# Patient Record
Sex: FEMALE | Race: WHITE | NOT HISPANIC OR LATINO | Employment: UNEMPLOYED | URBAN - METROPOLITAN AREA
[De-identification: names, ages, dates, MRNs, and addresses within clinical notes are randomized per-mention and may not be internally consistent; named-entity substitution may affect disease eponyms.]

---

## 2017-02-28 ENCOUNTER — TELEPHONE (OUTPATIENT)
Dept: ORTHOPEDIC SURGERY | Facility: CLINIC | Age: 52
End: 2017-02-28

## 2017-02-28 ENCOUNTER — OFFICE VISIT (OUTPATIENT)
Dept: ORTHOPEDIC SURGERY | Facility: CLINIC | Age: 52
End: 2017-02-28

## 2017-02-28 DIAGNOSIS — G56.01 CARPAL TUNNEL SYNDROME OF RIGHT WRIST: ICD-10-CM

## 2017-02-28 DIAGNOSIS — R52 PAIN: Primary | ICD-10-CM

## 2017-02-28 DIAGNOSIS — M65.341 TRIGGER RING FINGER OF RIGHT HAND: ICD-10-CM

## 2017-02-28 PROCEDURE — 99213 OFFICE O/P EST LOW 20 MIN: CPT | Performed by: NURSE PRACTITIONER

## 2017-02-28 PROCEDURE — 73130 X-RAY EXAM OF HAND: CPT | Performed by: NURSE PRACTITIONER

## 2017-02-28 PROCEDURE — 20600 DRAIN/INJ JOINT/BURSA W/O US: CPT | Performed by: NURSE PRACTITIONER

## 2017-02-28 RX ORDER — LIDOCAINE HYDROCHLORIDE 10 MG/ML
1 INJECTION, SOLUTION INFILTRATION; PERINEURAL
Status: COMPLETED | OUTPATIENT
Start: 2017-02-28 | End: 2017-02-28

## 2017-02-28 RX ORDER — TRIAMCINOLONE ACETONIDE 40 MG/ML
40 INJECTION, SUSPENSION INTRA-ARTICULAR; INTRAMUSCULAR
Status: COMPLETED | OUTPATIENT
Start: 2017-02-28 | End: 2017-02-28

## 2017-02-28 RX ADMIN — TRIAMCINOLONE ACETONIDE 40 MG: 40 INJECTION, SUSPENSION INTRA-ARTICULAR; INTRAMUSCULAR at 09:08

## 2017-02-28 RX ADMIN — LIDOCAINE HYDROCHLORIDE 1 ML: 10 INJECTION, SOLUTION INFILTRATION; PERINEURAL at 09:08

## 2017-02-28 NOTE — TELEPHONE ENCOUNTER
Patient calling stating that her hand is killing her since the injection has worn off.  She has been advised to ice the site for 20 minutes on and 20 minutes off. Patient is currently on Xarelto.     Please advise.

## 2017-02-28 NOTE — PROGRESS NOTES
Subjective:     Patient ID: Jacranjith Campbell is a 52 y.o. female.    Chief Complaint: Right hand, fourth digit pain    History of Present Illness  Patient is 52 y.o female who presents with a reported one week history of pain present fourth digit, right hand palmar surface. Reports decreased range of motion fourth digit due to increased pain. She is right hand dominant and pain is interfering with her duties at work. Denies known injury, has been seen by PCP and instructed likely fracture at fourth digit. Denies prior x-ray, MRI and all other imaging. Reports injury to hand back in November 2016, believed she fractured at that time however has not had issues with hand/finger since that time. Denies all other concerns present at this time.      Social History     Occupational History   • Not on file.     Social History Main Topics   • Smoking status: Current Every Day Smoker     Types: Electronic Cigarette   • Smokeless tobacco: Never Used      Comment: e cigarettes   • Alcohol use No   • Drug use: No   • Sexual activity: Defer      Past Medical History   Diagnosis Date   • Anxiety    • Asthma    • COPD (chronic obstructive pulmonary disease)    • Depression    • DVT (deep venous thrombosis)    • Epilepsy    • Osteoarthritis    • Seasonal allergies    • Seizures      Past Surgical History   Procedure Laterality Date   • Hysterectomy     • Bladder surgery     • Carpal tunnel release     • Trigger finger release     • Pr ercp dx collection specimen brushing/washing N/A 10/26/2016     Procedure: ENDOSCOPIC RETROGRADE CHOLANGIOPANCREATOGRAPHY dilation common bile duct;  Surgeon: Steven Flynn MD;  Location: [facility];  Service: Gastroenterology   • Ercp         Family History   Problem Relation Age of Onset   • Cancer Mother          Review of Systems   Constitutional: Negative for chills, diaphoresis, fever and unexpected weight change.   HENT: Negative for hearing loss, nosebleeds, sore throat and  tinnitus.    Eyes: Negative for pain and visual disturbance.   Respiratory: Negative for cough, shortness of breath and wheezing.    Cardiovascular: Negative for chest pain and palpitations.   Gastrointestinal: Negative for abdominal pain, diarrhea, nausea and vomiting.   Endocrine: Negative for cold intolerance, heat intolerance and polydipsia.   Genitourinary: Negative for difficulty urinating, dysuria and hematuria.   Musculoskeletal: Negative for arthralgias, joint swelling and myalgias.   Skin: Negative for rash and wound.   Allergic/Immunologic: Negative for environmental allergies.   Neurological: Negative for dizziness, syncope and numbness.   Hematological: Does not bruise/bleed easily.   Psychiatric/Behavioral: Negative for dysphoric mood and sleep disturbance. The patient is not nervous/anxious.    All other systems reviewed and are negative.          Objective:  Physical Exam    General: No acute distress.  Eyes: conjunctiva clear; pupils equally round and reactive  ENT: external ears and nose atraumatic; oropharynx clear  CV: no peripheral edema  Resp: normal respiratory effort  Skin: no rashes or wounds; normal turgor  Psych: mood and affect appropriate; recent and remote memory intact    There were no vitals filed for this visit.  There were no vitals filed for this visit.  There is no height or weight on file to calculate BMI.     Right Hand Exam     Tenderness   The patient is experiencing tenderness in the chaves area.    Range of Motion     Hand   MP Ring: abnormal   PIP Ring: normal   DIP Ring: normal     Muscle Strength   Wrist Extension: 5/5   Wrist Flexion: 5/5   : 4/5     Tests   Phalen’s Sign: positive  Tinel’s Sign (Medial Nerve): positive  Finkelstein: negative    Other   Erythema: absent  Scars: absent  Sensation: normal  Pulse: present    Comments:  Mild edema fourth digit, palmar surface           Imaging:  Right Hand X-Ray  Indication: Pain  AP, Lateral, and Oblique  views    Findings:  No fracture  No bony lesion  Normal soft tissues  No prior studies were available for comparison.    Assessment:       1. Pain    2. Trigger ring finger of right hand          Plan:  1. Discussed plan of care with patient. Wishes to proceed with corticosteroid injection right hand.  2. Provided with wrist immobilizer right wrist. Discussed to be worn until pain has subsided. Will follow-up if symptoms not improving. Patient verbalized understanding of all information and agrees with plan of care. Denies all other concerns present at this time.       Small Joint Arthrocentesis  Procedure Details  Location: ring finger -   Needle size: 22 G  Medications administered: 1 mL lidocaine 1 %; 40 mg triamcinolone acetonide 40 MG/ML

## 2017-04-10 ENCOUNTER — OFFICE (OUTPATIENT)
Dept: URBAN - METROPOLITAN AREA CLINIC 71 | Facility: CLINIC | Age: 52
End: 2017-04-10
Payer: COMMERCIAL

## 2017-04-10 VITALS
HEART RATE: 72 BPM | WEIGHT: 136 LBS | SYSTOLIC BLOOD PRESSURE: 110 MMHG | DIASTOLIC BLOOD PRESSURE: 80 MMHG | HEIGHT: 59 IN

## 2017-04-10 DIAGNOSIS — K21.0 GASTRO-ESOPHAGEAL REFLUX DISEASE WITH ESOPHAGITIS: ICD-10-CM

## 2017-04-10 DIAGNOSIS — K30 FUNCTIONAL DYSPEPSIA: ICD-10-CM

## 2017-04-10 PROCEDURE — 99212 OFFICE O/P EST SF 10 MIN: CPT

## 2017-04-10 RX ORDER — SODIUM PHOSPHATE, MONOBASIC, MONOHYDRATE, SODIUM PHOSPHATE, DIBASIC ANHYDROUS 1.102; .398 G/1; G/1
TABLET ORAL
Qty: 28 | Refills: 0 | Status: ACTIVE
Start: 2017-04-10

## 2017-05-11 ENCOUNTER — ANESTHESIA EVENT (OUTPATIENT)
Dept: PERIOP | Facility: HOSPITAL | Age: 52
End: 2017-05-11

## 2017-05-12 ENCOUNTER — PREP FOR SURGERY (OUTPATIENT)
Dept: GASTROENTEROLOGY | Facility: HOSPITAL | Age: 52
End: 2017-05-12

## 2017-05-12 ENCOUNTER — ON CAMPUS - OUTPATIENT (OUTPATIENT)
Dept: URBAN - METROPOLITAN AREA HOSPITAL 28 | Facility: HOSPITAL | Age: 52
End: 2017-05-12
Payer: COMMERCIAL

## 2017-05-12 ENCOUNTER — HOSPITAL ENCOUNTER (OUTPATIENT)
Facility: HOSPITAL | Age: 52
Setting detail: HOSPITAL OUTPATIENT SURGERY
Discharge: HOME | End: 2017-05-12
Attending: INTERNAL MEDICINE | Admitting: INTERNAL MEDICINE

## 2017-05-12 ENCOUNTER — ANESTHESIA (OUTPATIENT)
Dept: PERIOP | Facility: HOSPITAL | Age: 52
End: 2017-05-12

## 2017-05-12 VITALS
TEMPERATURE: 98 F | HEART RATE: 76 BPM | HEIGHT: 59 IN | SYSTOLIC BLOOD PRESSURE: 122 MMHG | OXYGEN SATURATION: 98 % | BODY MASS INDEX: 27.44 KG/M2 | DIASTOLIC BLOOD PRESSURE: 85 MMHG | RESPIRATION RATE: 15 BRPM | WEIGHT: 136.13 LBS

## 2017-05-12 DIAGNOSIS — K62.1 RECTAL POLYP: ICD-10-CM

## 2017-05-12 DIAGNOSIS — K21.0 GASTRO-ESOPHAGEAL REFLUX DISEASE WITH ESOPHAGITIS: ICD-10-CM

## 2017-05-12 DIAGNOSIS — Z12.11 SCREENING FOR COLON CANCER: ICD-10-CM

## 2017-05-12 DIAGNOSIS — R10.13 DYSPEPSIA: ICD-10-CM

## 2017-05-12 DIAGNOSIS — D12.2 BENIGN NEOPLASM OF ASCENDING COLON: ICD-10-CM

## 2017-05-12 DIAGNOSIS — R10.13 EPIGASTRIC PAIN: ICD-10-CM

## 2017-05-12 DIAGNOSIS — D12.3 BENIGN NEOPLASM OF TRANSVERSE COLON: ICD-10-CM

## 2017-05-12 DIAGNOSIS — K30 FUNCTIONAL DYSPEPSIA: ICD-10-CM

## 2017-05-12 DIAGNOSIS — K29.70 GASTRITIS, UNSPECIFIED, WITHOUT BLEEDING: ICD-10-CM

## 2017-05-12 DIAGNOSIS — Z12.11 ENCOUNTER FOR SCREENING FOR MALIGNANT NEOPLASM OF COLON: ICD-10-CM

## 2017-05-12 DIAGNOSIS — D12.5 BENIGN NEOPLASM OF SIGMOID COLON: ICD-10-CM

## 2017-05-12 PROCEDURE — 45380 COLONOSCOPY AND BIOPSY: CPT

## 2017-05-12 PROCEDURE — 25010000002 FENTANYL CITRATE (PF) 100 MCG/2ML SOLUTION: Performed by: NURSE ANESTHETIST, CERTIFIED REGISTERED

## 2017-05-12 PROCEDURE — 25010000002 PROPOFOL 10 MG/ML EMULSION: Performed by: NURSE ANESTHETIST, CERTIFIED REGISTERED

## 2017-05-12 PROCEDURE — 43239 EGD BIOPSY SINGLE/MULTIPLE: CPT

## 2017-05-12 RX ORDER — LIDOCAINE HYDROCHLORIDE 20 MG/ML
INJECTION, SOLUTION INFILTRATION; PERINEURAL AS NEEDED
Status: DISCONTINUED | OUTPATIENT
Start: 2017-05-12 | End: 2017-05-12 | Stop reason: SURG

## 2017-05-12 RX ORDER — PROPOFOL 10 MG/ML
VIAL (ML) INTRAVENOUS CONTINUOUS PRN
Status: DISCONTINUED | OUTPATIENT
Start: 2017-05-12 | End: 2017-05-12 | Stop reason: SURG

## 2017-05-12 RX ORDER — SODIUM CHLORIDE 0.9 % (FLUSH) 0.9 %
1-10 SYRINGE (ML) INJECTION AS NEEDED
Status: CANCELLED | OUTPATIENT
Start: 2017-05-12

## 2017-05-12 RX ORDER — PROPOFOL 10 MG/ML
VIAL (ML) INTRAVENOUS AS NEEDED
Status: DISCONTINUED | OUTPATIENT
Start: 2017-05-12 | End: 2017-05-12 | Stop reason: SURG

## 2017-05-12 RX ORDER — FENTANYL CITRATE 50 UG/ML
INJECTION, SOLUTION INTRAMUSCULAR; INTRAVENOUS AS NEEDED
Status: DISCONTINUED | OUTPATIENT
Start: 2017-05-12 | End: 2017-05-12 | Stop reason: SURG

## 2017-05-12 RX ORDER — SODIUM CHLORIDE 0.9 % (FLUSH) 0.9 %
1-10 SYRINGE (ML) INJECTION AS NEEDED
Status: DISCONTINUED | OUTPATIENT
Start: 2017-05-12 | End: 2017-05-12 | Stop reason: HOSPADM

## 2017-05-12 RX ORDER — LIDOCAINE HYDROCHLORIDE 10 MG/ML
0.5 INJECTION, SOLUTION EPIDURAL; INFILTRATION; INTRACAUDAL; PERINEURAL ONCE AS NEEDED
Status: COMPLETED | OUTPATIENT
Start: 2017-05-12 | End: 2017-05-12

## 2017-05-12 RX ORDER — SODIUM CHLORIDE, SODIUM LACTATE, POTASSIUM CHLORIDE, CALCIUM CHLORIDE 600; 310; 30; 20 MG/100ML; MG/100ML; MG/100ML; MG/100ML
9 INJECTION, SOLUTION INTRAVENOUS CONTINUOUS PRN
Status: DISCONTINUED | OUTPATIENT
Start: 2017-05-12 | End: 2017-05-12 | Stop reason: HOSPADM

## 2017-05-12 RX ORDER — MAGNESIUM HYDROXIDE 1200 MG/15ML
LIQUID ORAL AS NEEDED
Status: DISCONTINUED | OUTPATIENT
Start: 2017-05-12 | End: 2017-05-12 | Stop reason: HOSPADM

## 2017-05-12 RX ADMIN — FENTANYL CITRATE 25 MCG: 50 INJECTION, SOLUTION INTRAMUSCULAR; INTRAVENOUS at 10:34

## 2017-05-12 RX ADMIN — SODIUM CHLORIDE, POTASSIUM CHLORIDE, SODIUM LACTATE AND CALCIUM CHLORIDE: 600; 310; 30; 20 INJECTION, SOLUTION INTRAVENOUS at 09:49

## 2017-05-12 RX ADMIN — SODIUM CHLORIDE, POTASSIUM CHLORIDE, SODIUM LACTATE AND CALCIUM CHLORIDE 9 ML/HR: 600; 310; 30; 20 INJECTION, SOLUTION INTRAVENOUS at 10:17

## 2017-05-12 RX ADMIN — PROPOFOL 50 MG: 10 INJECTION, EMULSION INTRAVENOUS at 10:33

## 2017-05-12 RX ADMIN — PROPOFOL 50 MG: 10 INJECTION, EMULSION INTRAVENOUS at 10:31

## 2017-05-12 RX ADMIN — PROPOFOL 75 MCG/KG/MIN: 10 INJECTION, EMULSION INTRAVENOUS at 10:28

## 2017-05-12 RX ADMIN — PROPOFOL 50 MG: 10 INJECTION, EMULSION INTRAVENOUS at 10:52

## 2017-05-12 RX ADMIN — LIDOCAINE HYDROCHLORIDE 0.5 ML: 10 INJECTION, SOLUTION EPIDURAL; INFILTRATION; INTRACAUDAL; PERINEURAL at 10:17

## 2017-05-12 RX ADMIN — LIDOCAINE HYDROCHLORIDE 100 MG: 20 INJECTION, SOLUTION INFILTRATION; PERINEURAL at 10:28

## 2017-05-12 RX ADMIN — PROPOFOL 50 MG: 10 INJECTION, EMULSION INTRAVENOUS at 10:36

## 2017-05-12 RX ADMIN — PROPOFOL 50 MG: 10 INJECTION, EMULSION INTRAVENOUS at 10:29

## 2017-05-12 RX ADMIN — PROPOFOL 50 MG: 10 INJECTION, EMULSION INTRAVENOUS at 10:44

## 2017-05-12 RX ADMIN — FENTANYL CITRATE 25 MCG: 50 INJECTION, SOLUTION INTRAMUSCULAR; INTRAVENOUS at 10:32

## 2017-05-12 RX ADMIN — TOPICAL ANESTHETIC 1 SPRAY: 200 SPRAY DENTAL; PERIODONTAL at 10:27

## 2017-05-12 RX ADMIN — PROPOFOL 50 MG: 10 INJECTION, EMULSION INTRAVENOUS at 10:40

## 2017-05-17 LAB
LAB AP CASE REPORT: NORMAL
Lab: NORMAL
PATH REPORT.FINAL DX SPEC: NORMAL

## 2017-08-28 ENCOUNTER — OFFICE (OUTPATIENT)
Dept: URBAN - METROPOLITAN AREA CLINIC 71 | Facility: CLINIC | Age: 52
End: 2017-08-28
Payer: COMMERCIAL

## 2017-08-28 VITALS
HEIGHT: 59 IN | DIASTOLIC BLOOD PRESSURE: 68 MMHG | SYSTOLIC BLOOD PRESSURE: 102 MMHG | HEART RATE: 72 BPM | WEIGHT: 142 LBS

## 2017-08-28 DIAGNOSIS — Z86.010 PERSONAL HISTORY OF COLONIC POLYPS: ICD-10-CM

## 2017-08-28 DIAGNOSIS — K29.70 GASTRITIS, UNSPECIFIED, WITHOUT BLEEDING: ICD-10-CM

## 2017-08-28 DIAGNOSIS — K22.70 BARRETT'S ESOPHAGUS WITHOUT DYSPLASIA: ICD-10-CM

## 2017-08-28 PROCEDURE — 99213 OFFICE O/P EST LOW 20 MIN: CPT

## 2017-08-28 RX ORDER — OMEPRAZOLE 40 MG/1
80 CAPSULE, DELAYED RELEASE ORAL
Qty: 180 | Refills: 4 | Status: ACTIVE
Start: 2017-08-28

## 2023-12-20 PROBLEM — K21.00 GASTROESOPHAGEAL REFLUX DISEASE WITH ESOPHAGITIS WITHOUT HEMORRHAGE: Status: ACTIVE | Noted: 2023-12-20

## 2023-12-20 PROBLEM — K58.1 IRRITABLE BOWEL SYNDROME WITH CONSTIPATION: Status: ACTIVE | Noted: 2023-12-20

## 2023-12-20 PROBLEM — Z86.010 PERSONAL HISTORY OF COLONIC POLYPS: Status: ACTIVE | Noted: 2023-12-20

## 2023-12-20 PROBLEM — R10.13 DYSPEPSIA: Status: ACTIVE | Noted: 2023-12-20

## 2023-12-20 PROBLEM — Z86.0100 PERSONAL HISTORY OF COLONIC POLYPS: Status: ACTIVE | Noted: 2023-12-20

## 2023-12-28 ENCOUNTER — OFFICE VISIT (OUTPATIENT)
Age: 58
End: 2023-12-28
Payer: COMMERCIAL

## 2023-12-28 VITALS — DIASTOLIC BLOOD PRESSURE: 70 MMHG | BODY MASS INDEX: 26.05 KG/M2 | SYSTOLIC BLOOD PRESSURE: 118 MMHG | WEIGHT: 129 LBS

## 2023-12-28 DIAGNOSIS — K21.00 GASTROESOPHAGEAL REFLUX DISEASE WITH ESOPHAGITIS WITHOUT HEMORRHAGE: ICD-10-CM

## 2023-12-28 DIAGNOSIS — R10.13 DYSPEPSIA: ICD-10-CM

## 2023-12-28 DIAGNOSIS — K58.1 IRRITABLE BOWEL SYNDROME WITH CONSTIPATION: Primary | ICD-10-CM

## 2023-12-28 DIAGNOSIS — Z86.010 PERSONAL HISTORY OF COLONIC POLYPS: ICD-10-CM

## 2023-12-28 RX ORDER — OMEPRAZOLE 40 MG/1
40 CAPSULE, DELAYED RELEASE ORAL
Qty: 180 CAPSULE | Refills: 3 | Status: SHIPPED | OUTPATIENT
Start: 2023-12-28

## 2023-12-28 RX ORDER — DIPHENHYDRAMINE HCL 25 MG/1
25 TABLET ORAL NIGHTLY PRN
COMMUNITY

## 2023-12-28 RX ORDER — CITALOPRAM 20 MG/1
20 TABLET ORAL DAILY
COMMUNITY

## 2023-12-28 RX ORDER — LISINOPRIL 2.5 MG/1
2.5 TABLET ORAL DAILY
COMMUNITY
Start: 2023-05-03 | End: 2024-05-02

## 2023-12-28 NOTE — PROGRESS NOTES
Sarkis Campbell is a 58 y.o. female with a past medical history of IBS-C, COPD, Asthma, DVT on Xarelto + noted below who presents for evaluation of   Chief Complaint   Patient presents with    Abdominal Pain    Constipation       Subjective     # IBS-C   # Personal history of colon polyps  - Reports having a BM every 2 weeks that's been ongoing for at least 2 years.  - Seen by her PCP on 11/28 and started on Doc-senna 1 tab daily but has   - Last colonoscopy in 5/2017 had 4 sub-cm polyps removed (3 were tubular adenomas and one hyperplastic polyp). Surveillance c/s was due in 5/2022.      # Dyspepsia   - Adherent to Omeprazole 40 mg QD.   - Endorses post-prandial epigastric pain occurring once per week despite adherence to Omeprazole 40 mg QD.      # GERD   - Adherent to Omeprazole 40 mg QD. Denies heartburn.   - Last EGD in 5/2017 had reflux esophagitis (biopsied -- BE without dysplasia), gastritis (biopsied -- negative for H Pylori).                    Past Medical History:   Diagnosis Date    Anxiety     Asthma     Back pain     COPD (chronic obstructive pulmonary disease)     Depression     DVT (deep venous thrombosis)     Epilepsy     Osteoarthritis     Seasonal allergies     Seizures          Current Outpatient Medications:     Aclidinium Bromide (TUDORZA PRESSAIR IN), Inhale., Disp: , Rfl:     albuterol (PROVENTIL HFA;VENTOLIN HFA) 108 (90 BASE) MCG/ACT inhaler, Inhale 2 puffs Every 4 (Four) Hours As Needed for Wheezing., Disp: , Rfl:     Banophen 25 MG tablet, Take 1 tablet by mouth At Night As Needed., Disp: , Rfl:     CarBAMazepine ER, Antipsych, 300 MG capsule sustained-release 12 hr, Take  by mouth 2 (two) times a day., Disp: , Rfl:     citalopram (CeleXA) 20 MG tablet, Take 1 tablet by mouth Daily., Disp: , Rfl:     fluticasone-salmeterol (ADVAIR) 250-50 MCG/DOSE DISKUS, Inhale 2 (two) times a day., Disp: , Rfl:     lisinopril (PRINIVIL,ZESTRIL) 2.5 MG tablet, Take 1 tablet by mouth Daily., Disp: ,  Rfl:     loratadine (CLARITIN) 10 MG tablet, Take 1 tablet by mouth Daily., Disp: , Rfl:     omeprazole (PrilOSEC) 40 MG capsule, Take 1 capsule by mouth 2 (Two) Times a Day Before Meals., Disp: 180 capsule, Rfl: 3    pramipexole (MIRAPEX) 0.25 MG tablet, Take 1 tablet by mouth., Disp: , Rfl:     ARIPiprazole (ABILIFY) 5 MG tablet, Take  by mouth daily. (Patient not taking: Reported on 12/28/2023), Disp: , Rfl:     DULoxetine (CYMBALTA) 30 MG capsule, Take 30 mg by mouth daily. (Patient not taking: Reported on 12/28/2023), Disp: , Rfl:     levETIRAcetam (KEPPRA) 500 MG tablet, Take 500 mg by mouth 2 (Two) Times a Day. (Patient not taking: Reported on 12/28/2023), Disp: , Rfl:     linaclotide (Linzess) 145 MCG capsule capsule, Take 1 capsule by mouth Every Morning Before Breakfast., Disp: 30 capsule, Rfl: 11    moxifloxacin (AVELOX) 400 MG tablet, Take 1 tablet by mouth Daily. (Patient not taking: Reported on 12/28/2023), Disp: 4 tablet, Rfl: 0    rivaroxaban (XARELTO) 20 MG tablet, Take 20 mg by mouth Daily. Stopped medication for ERCP on 10/22/16.  Has been out of medication and waiting for her doctor to refill med. (Patient not taking: Reported on 12/28/2023), Disp: , Rfl:     rivaroxaban (XARELTO) 20 MG tablet, Take 1 tablet by mouth Daily. (Patient not taking: Reported on 12/28/2023), Disp: 30 tablet, Rfl: 0    venlafaxine (EFFEXOR) 75 MG tablet, Take 75 mg by mouth 2 (two) times a day. (Patient not taking: Reported on 12/28/2023), Disp: , Rfl:     Allergies   Allergen Reactions    Codeine     Darvon [Propoxyphene]      DARVOCET    Sulfamethoxazole-Trimethoprim     Tramadol     Tylenol [Acetaminophen]     Zithromax [Azithromycin]        Social History     Socioeconomic History    Marital status:    Tobacco Use    Smoking status: Every Day     Types: Cigarettes     Passive exposure: Current    Smokeless tobacco: Never    Tobacco comments:     e cigarettes   Vaping Use    Vaping Use: Former   Substance and  Sexual Activity    Alcohol use: No    Drug use: No    Sexual activity: Defer     Birth control/protection: Abstinence       Family History   Problem Relation Age of Onset    Cancer Mother        Objective     Vitals:    12/28/23 1543   BP: 118/70         12/28/23  1543   Weight: 58.5 kg (129 lb) (Patient reported)     Body mass index is 26.05 kg/m².    Physical Exam  Constitutional:       Appearance: Normal appearance.   HENT:      Head: Normocephalic and atraumatic.   Eyes:      Extraocular Movements: Extraocular movements intact.      Conjunctiva/sclera: Conjunctivae normal.   Cardiovascular:      Rate and Rhythm: Normal rate and regular rhythm.      Heart sounds: Normal heart sounds.   Pulmonary:      Effort: Pulmonary effort is normal.      Breath sounds: Normal breath sounds.   Abdominal:      General: Abdomen is flat. Bowel sounds are normal. There is no distension.      Palpations: Abdomen is soft.      Tenderness: There is abdominal tenderness (mild epigastric).   Neurological:      Mental Status: She is alert.   Psychiatric:         Mood and Affect: Mood normal.         Behavior: Behavior normal.         WBC   Date Value Ref Range Status   10/26/2016 5.56 4.80 - 10.80 10*3/mm3 Final     RBC   Date Value Ref Range Status   10/26/2016 3.95 (L) 4.20 - 5.40 10*6/mm3 Final     Hemoglobin   Date Value Ref Range Status   10/26/2016 12.8 12.0 - 16.0 g/dL Final     Hematocrit   Date Value Ref Range Status   10/26/2016 38.0 37.0 - 47.0 % Final     MCV   Date Value Ref Range Status   10/26/2016 96.2 81.0 - 99.0 fL Final     MCH   Date Value Ref Range Status   10/26/2016 32.4 (H) 27.0 - 31.0 pg Final     MCHC   Date Value Ref Range Status   10/26/2016 33.7 31.0 - 37.0 g/dL Final     RDW   Date Value Ref Range Status   10/26/2016 17.9 (H) 11.5 - 14.5 % Final     RDW-SD   Date Value Ref Range Status   10/26/2016 63.6 (H) 37.0 - 54.0 fl Final     MPV   Date Value Ref Range Status   10/26/2016 10.6 (H) 7.4 - 10.4 fL Final      Platelets   Date Value Ref Range Status   10/26/2016 213 140 - 500 10*3/mm3 Final     Neutrophil %   Date Value Ref Range Status   10/26/2016 52.5 45.0 - 70.0 % Final     Lymphocyte %   Date Value Ref Range Status   10/26/2016 38.3 20.0 - 45.0 % Final     Monocyte %   Date Value Ref Range Status   10/26/2016 7.6 3.0 - 8.0 % Final     Eosinophil %   Date Value Ref Range Status   10/26/2016 0.0 0.0 - 4.0 % Final     Basophil %   Date Value Ref Range Status   10/26/2016 1.6 0.0 - 2.0 % Final     Immature Grans %   Date Value Ref Range Status   10/26/2016 0.0 0.0 - 0.5 % Final     Neutrophils, Absolute   Date Value Ref Range Status   10/26/2016 2.92 1.50 - 8.30 10*3/mm3 Final     Lymphocytes, Absolute   Date Value Ref Range Status   10/26/2016 2.13 0.60 - 4.80 10*3/mm3 Final     Monocytes, Absolute   Date Value Ref Range Status   10/26/2016 0.42 0.00 - 1.00 10*3/mm3 Final     Eosinophils, Absolute   Date Value Ref Range Status   10/26/2016 0.00 (L) 0.10 - 0.30 10*3/mm3 Final     Basophils, Absolute   Date Value Ref Range Status   10/26/2016 0.09 0.00 - 0.20 10*3/mm3 Final     Immature Grans, Absolute   Date Value Ref Range Status   10/26/2016 0.00 0.00 - 0.03 10*3/mm3 Final     nRBC   Date Value Ref Range Status   10/26/2016 0.0 0.0 - 0.0 /100 WBC Final       Glucose   Date Value Ref Range Status   10/26/2016 80 65 - 99 mg/dL Final     Sodium   Date Value Ref Range Status   11/19/2021 139 137 - 145 mmol/L Final     Potassium   Date Value Ref Range Status   11/19/2021 3.9 3.5 - 5.1 mmol/L Final     Total CO2   Date Value Ref Range Status   11/19/2021 24 22 - 30 mmol/L Final     Chloride   Date Value Ref Range Status   11/19/2021 100 98 - 107 mmol/L Final     Anion Gap   Date Value Ref Range Status   10/26/2016 11.2 mmol/L Final     Creatinine   Date Value Ref Range Status   11/19/2021 0.7 0.7 - 1.5 mg/dL Final     BUN   Date Value Ref Range Status   11/19/2021 9 7 - 20 mg/dL Final     BUN/Creatinine Ratio   Date  Value Ref Range Status   11/19/2021 13.0 10.0 - 20.0 RATIO Final     Calcium   Date Value Ref Range Status   11/19/2021 9.3 8.4 - 10.2 mg/dL Final     eGFR Non  Amer   Date Value Ref Range Status   10/26/2016 102 >60 mL/min/1.73 Final     Alkaline Phosphatase   Date Value Ref Range Status   11/19/2021 90 38 - 126 U/L Final     Total Protein   Date Value Ref Range Status   11/19/2021 6.7 6.3 - 8.2 g/dL Final     ALT (SGPT)   Date Value Ref Range Status   11/19/2021 11 (L) 13 - 69 U/L Final     AST (SGOT)   Date Value Ref Range Status   11/19/2021 20 15 - 46 U/L Final     Total Bilirubin   Date Value Ref Range Status   11/19/2021 0.3 0.2 - 1.3 mg/dL Final     Albumin   Date Value Ref Range Status   11/19/2021 4.3 3.5 - 5.0 g/dL Final     Globulin   Date Value Ref Range Status   11/19/2021 2.4 1.5 - 4.5 g/dL Final     A/G Ratio   Date Value Ref Range Status   11/19/2021 1.8 1.1 - 2.5 RATIO Final         Imaging Results (Last 7 Days)       ** No results found for the last 168 hours. **                   Assessment & Plan     Diagnoses and all orders for this visit:    1. Irritable bowel syndrome with constipation (Primary)  Assessment & Plan:  - Start Linzess 145 mcg QD      Orders:  -     linaclotide (Linzess) 145 MCG capsule capsule; Take 1 capsule by mouth Every Morning Before Breakfast.  Dispense: 30 capsule; Refill: 11    2. Dyspepsia  Assessment & Plan:  - Suspect the etiology is due to IBS-C   - Increase to Omeprazole 40 mg BID   - EGD to further evaluate     Orders:  -     omeprazole (PrilOSEC) 40 MG capsule; Take 1 capsule by mouth 2 (Two) Times a Day Before Meals.  Dispense: 180 capsule; Refill: 3  -     Case Request; Standing  -     Case Request    3. Gastroesophageal reflux disease with esophagitis without hemorrhage  Assessment & Plan:  - Continue Omeprazole 40 mg QD   - EGD to evaluate for possible BE given last EGD in 2017       4. Personal history of colonic polyps  Assessment & Plan:  - Overdue  for surveillance c/s, will schedule      Orders:  -     Case Request; Standing  -     Case Request    Other orders  -     Obtain Informed Consent; Standing  -     Follow Anesthesia Guidelines / Protocol; Future  -     Verify Bowel Prep Was Successful; Standing  -     Give Tap Water Enema If Bowel Prep Insufficient; Standing    RTC in 3 months    I have discussed the above plan with the patient.  They verbalize understanding and are in agreement with the plan.  They have been advised to contact the office for any questions, concerns, or changes related to their health.

## 2023-12-28 NOTE — ASSESSMENT & PLAN NOTE
- Suspect the etiology is due to IBS-C   - Increase to Omeprazole 40 mg BID   - EGD to further evaluate

## 2024-01-05 ENCOUNTER — ANESTHESIA EVENT (OUTPATIENT)
Dept: PERIOP | Facility: HOSPITAL | Age: 59
End: 2024-01-05
Payer: COMMERCIAL

## 2024-01-08 ENCOUNTER — ANESTHESIA (OUTPATIENT)
Dept: PERIOP | Facility: HOSPITAL | Age: 59
End: 2024-01-08
Payer: COMMERCIAL

## 2024-01-08 ENCOUNTER — HOSPITAL ENCOUNTER (OUTPATIENT)
Facility: HOSPITAL | Age: 59
Setting detail: HOSPITAL OUTPATIENT SURGERY
Discharge: HOME | End: 2024-01-08
Attending: STUDENT IN AN ORGANIZED HEALTH CARE EDUCATION/TRAINING PROGRAM | Admitting: STUDENT IN AN ORGANIZED HEALTH CARE EDUCATION/TRAINING PROGRAM
Payer: COMMERCIAL

## 2024-01-08 VITALS
WEIGHT: 126.4 LBS | BODY MASS INDEX: 25.48 KG/M2 | RESPIRATION RATE: 16 BRPM | OXYGEN SATURATION: 98 % | HEART RATE: 78 BPM | SYSTOLIC BLOOD PRESSURE: 119 MMHG | DIASTOLIC BLOOD PRESSURE: 67 MMHG | TEMPERATURE: 97.6 F | HEIGHT: 59 IN

## 2024-01-08 DIAGNOSIS — Z86.010 PERSONAL HISTORY OF COLONIC POLYPS: Primary | ICD-10-CM

## 2024-01-08 DIAGNOSIS — R10.13 DYSPEPSIA: ICD-10-CM

## 2024-01-08 DIAGNOSIS — Z86.010 PERSONAL HISTORY OF COLONIC POLYPS: ICD-10-CM

## 2024-01-08 PROCEDURE — 88342 IMHCHEM/IMCYTCHM 1ST ANTB: CPT | Performed by: STUDENT IN AN ORGANIZED HEALTH CARE EDUCATION/TRAINING PROGRAM

## 2024-01-08 PROCEDURE — 25010000002 PROPOFOL 200 MG/20ML EMULSION: Performed by: NURSE ANESTHETIST, CERTIFIED REGISTERED

## 2024-01-08 PROCEDURE — 88305 TISSUE EXAM BY PATHOLOGIST: CPT | Performed by: STUDENT IN AN ORGANIZED HEALTH CARE EDUCATION/TRAINING PROGRAM

## 2024-01-08 PROCEDURE — 45378 DIAGNOSTIC COLONOSCOPY: CPT | Performed by: STUDENT IN AN ORGANIZED HEALTH CARE EDUCATION/TRAINING PROGRAM

## 2024-01-08 PROCEDURE — 43239 EGD BIOPSY SINGLE/MULTIPLE: CPT | Performed by: STUDENT IN AN ORGANIZED HEALTH CARE EDUCATION/TRAINING PROGRAM

## 2024-01-08 PROCEDURE — 25810000003 LACTATED RINGERS PER 1000 ML: Performed by: NURSE ANESTHETIST, CERTIFIED REGISTERED

## 2024-01-08 RX ORDER — SODIUM CHLORIDE 9 MG/ML
40 INJECTION, SOLUTION INTRAVENOUS AS NEEDED
Status: DISCONTINUED | OUTPATIENT
Start: 2024-01-08 | End: 2024-01-08 | Stop reason: HOSPADM

## 2024-01-08 RX ORDER — SODIUM CHLORIDE 0.9 % (FLUSH) 0.9 %
10 SYRINGE (ML) INJECTION EVERY 12 HOURS SCHEDULED
Status: DISCONTINUED | OUTPATIENT
Start: 2024-01-08 | End: 2024-01-08 | Stop reason: HOSPADM

## 2024-01-08 RX ORDER — ONDANSETRON 2 MG/ML
4 INJECTION INTRAMUSCULAR; INTRAVENOUS ONCE AS NEEDED
Status: DISCONTINUED | OUTPATIENT
Start: 2024-01-08 | End: 2024-01-08 | Stop reason: HOSPADM

## 2024-01-08 RX ORDER — SODIUM CHLORIDE, SODIUM LACTATE, POTASSIUM CHLORIDE, CALCIUM CHLORIDE 600; 310; 30; 20 MG/100ML; MG/100ML; MG/100ML; MG/100ML
100 INJECTION, SOLUTION INTRAVENOUS CONTINUOUS
Status: DISCONTINUED | OUTPATIENT
Start: 2024-01-08 | End: 2024-01-08 | Stop reason: HOSPADM

## 2024-01-08 RX ORDER — SODIUM CHLORIDE, SODIUM LACTATE, POTASSIUM CHLORIDE, CALCIUM CHLORIDE 600; 310; 30; 20 MG/100ML; MG/100ML; MG/100ML; MG/100ML
9 INJECTION, SOLUTION INTRAVENOUS CONTINUOUS PRN
Status: DISCONTINUED | OUTPATIENT
Start: 2024-01-08 | End: 2024-01-08 | Stop reason: HOSPADM

## 2024-01-08 RX ORDER — LIDOCAINE HYDROCHLORIDE 20 MG/ML
INJECTION, SOLUTION INFILTRATION; PERINEURAL AS NEEDED
Status: DISCONTINUED | OUTPATIENT
Start: 2024-01-08 | End: 2024-01-08 | Stop reason: SURG

## 2024-01-08 RX ORDER — MAGNESIUM HYDROXIDE 1200 MG/15ML
LIQUID ORAL AS NEEDED
Status: DISCONTINUED | OUTPATIENT
Start: 2024-01-08 | End: 2024-01-08 | Stop reason: HOSPADM

## 2024-01-08 RX ORDER — SODIUM CHLORIDE 0.9 % (FLUSH) 0.9 %
10 SYRINGE (ML) INJECTION AS NEEDED
Status: DISCONTINUED | OUTPATIENT
Start: 2024-01-08 | End: 2024-01-08 | Stop reason: HOSPADM

## 2024-01-08 RX ORDER — PROPOFOL 10 MG/ML
INJECTION, EMULSION INTRAVENOUS AS NEEDED
Status: DISCONTINUED | OUTPATIENT
Start: 2024-01-08 | End: 2024-01-08 | Stop reason: SURG

## 2024-01-08 RX ADMIN — PROPOFOL INJECTABLE EMULSION 50 MG: 10 INJECTION, EMULSION INTRAVENOUS at 12:21

## 2024-01-08 RX ADMIN — PROPOFOL INJECTABLE EMULSION 100 MG: 10 INJECTION, EMULSION INTRAVENOUS at 11:59

## 2024-01-08 RX ADMIN — PROPOFOL INJECTABLE EMULSION 50 MG: 10 INJECTION, EMULSION INTRAVENOUS at 12:13

## 2024-01-08 RX ADMIN — PROPOFOL INJECTABLE EMULSION 50 MG: 10 INJECTION, EMULSION INTRAVENOUS at 12:17

## 2024-01-08 RX ADMIN — LIDOCAINE HYDROCHLORIDE 80 MG: 20 INJECTION, SOLUTION INFILTRATION; PERINEURAL at 11:59

## 2024-01-08 RX ADMIN — PROPOFOL INJECTABLE EMULSION 50 MG: 10 INJECTION, EMULSION INTRAVENOUS at 12:09

## 2024-01-08 RX ADMIN — PROPOFOL INJECTABLE EMULSION 50 MG: 10 INJECTION, EMULSION INTRAVENOUS at 12:11

## 2024-01-08 RX ADMIN — PROPOFOL INJECTABLE EMULSION 50 MG: 10 INJECTION, EMULSION INTRAVENOUS at 12:06

## 2024-01-08 RX ADMIN — PROPOFOL INJECTABLE EMULSION 50 MG: 10 INJECTION, EMULSION INTRAVENOUS at 12:00

## 2024-01-08 RX ADMIN — SODIUM CHLORIDE, POTASSIUM CHLORIDE, SODIUM LACTATE AND CALCIUM CHLORIDE 9 ML/HR: 600; 310; 30; 20 INJECTION, SOLUTION INTRAVENOUS at 10:26

## 2024-01-08 RX ADMIN — PROPOFOL INJECTABLE EMULSION 50 MG: 10 INJECTION, EMULSION INTRAVENOUS at 12:02

## 2024-01-08 RX ADMIN — PROPOFOL INJECTABLE EMULSION 50 MG: 10 INJECTION, EMULSION INTRAVENOUS at 12:25

## 2024-01-08 NOTE — H&P
Patient Care Team:  Patience Abbott APRN as PCP - General (Nurse Practitioner)    CHIEF COMPLAINT: Personal hx colon polyps and dyspepsia    HISTORY OF PRESENT ILLNESS:  EGD for dyspepsia.   C/s for personal history of colon polyps.   Last c/s in 5/2017 had 4 sub-cm polyps removed (3 were tubular adenomas and one hyperplastic polyp).    Past Medical History:   Diagnosis Date    Anxiety     Asthma     Back pain     COPD (chronic obstructive pulmonary disease)     Depression     DVT (deep venous thrombosis)     Epilepsy     Osteoarthritis     Seasonal allergies     Seizures      Past Surgical History:   Procedure Laterality Date    BLADDER SURGERY      CARPAL TUNNEL RELEASE      CHOLECYSTECTOMY      COLONOSCOPY N/A 5/12/2017    Procedure: COLONOSCOPY;  Surgeon: Steven Flynn MD;  Location: [facility] OR;  Service:     CYST REMOVAL Right     foot    ENDOSCOPY N/A 5/12/2017    Procedure: ESOPHAGOGASTRODUODENOSCOPY, biopsy;  Surgeon: Steven Flynn MD;  Location: [facility] OR;  Service:     ERCP      HYSTERECTOMY      KS ERCP DX COLLECTION SPECIMEN BRUSHING/WASHING N/A 10/26/2016    Procedure: ENDOSCOPIC RETROGRADE CHOLANGIOPANCREATOGRAPHY dilation common bile duct;  Surgeon: Steven Flynn MD;  Location: [facility] OR;  Service: Gastroenterology    TRIGGER FINGER RELEASE       Family History   Problem Relation Age of Onset    Cancer Mother     Malig Hyperthermia Neg Hx      Social History     Tobacco Use    Smoking status: Every Day     Packs/day: 3     Types: Cigarettes     Passive exposure: Current    Smokeless tobacco: Never    Tobacco comments:     3-4 packs/day since 2005   Vaping Use    Vaping Use: Former   Substance Use Topics    Alcohol use: No    Drug use: No     Medications Prior to Admission   Medication Sig Dispense Refill Last Dose    Aclidinium Bromide (TUDORZA PRESSAIR IN) Inhale.   1/5/2024    albuterol (PROVENTIL HFA;VENTOLIN HFA) 108 (90 BASE) MCG/ACT inhaler Inhale 2 puffs  "Every 4 (Four) Hours As Needed for Wheezing.   Past Week    ARIPiprazole (ABILIFY) 5 MG tablet Take  by mouth Daily.   1/4/2024    Banophen 25 MG tablet Take 1 tablet by mouth At Night As Needed.   1/5/2024    CarBAMazepine ER, Antipsych, 300 MG capsule sustained-release 12 hr Take  by mouth 2 (two) times a day.   1/5/2024    citalopram (CeleXA) 20 MG tablet Take 1 tablet by mouth Daily.   1/4/2024    DULoxetine (CYMBALTA) 30 MG capsule Take 1 capsule by mouth Daily.   1/4/2024    linaclotide (Linzess) 145 MCG capsule capsule Take 1 capsule by mouth Every Morning Before Breakfast. 30 capsule 11 1/5/2024    lisinopril (PRINIVIL,ZESTRIL) 2.5 MG tablet Take 1 tablet by mouth Daily.   1/4/2024    loratadine (CLARITIN) 10 MG tablet Take 1 tablet by mouth Daily.   1/5/2024    omeprazole (PrilOSEC) 40 MG capsule Take 1 capsule by mouth 2 (Two) Times a Day Before Meals. 180 capsule 3 1/4/2024    pramipexole (MIRAPEX) 0.25 MG tablet Take 1 tablet by mouth.   1/4/2024    venlafaxine (EFFEXOR) 75 MG tablet Take 1 tablet by mouth 2 (Two) Times a Day.   1/5/2024    fluticasone-salmeterol (ADVAIR) 250-50 MCG/DOSE DISKUS Inhale 2 (two) times a day.   1/3/2024     Allergies:  Codeine, Darvon [propoxyphene], Sulfamethoxazole-trimethoprim, Tramadol, Tylenol [acetaminophen], and Zithromax [azithromycin]    REVIEW OF SYSTEMS:  Please see the above history of present illness for pertinent positives and negatives.  The remainder of the patient's systems have been reviewed and are negative.     Vital Signs  Temp:  [97.6 °F (36.4 °C)] 97.6 °F (36.4 °C)    Flowsheet Rows      Flowsheet Row First Filed Value   Admission Height 149.9 cm (59\") Documented at 01/05/2024 1809   Admission Weight 57.3 kg (126 lb 6.4 oz) Documented at 01/08/2024 1010             Physical Exam:  Physical Exam   Constitutional: Patient appears well-developed and well-nourished and in no acute distress   HEENT:   Head: Normocephalic and atraumatic.   Eyes:  Pupils are " equal, round, and reactive to light. EOM are intact. Sclerae are anicteric and non-injected.  Mouth and Throat: Patient has moist mucous membranes. Oropharynx is clear of any erythema or exudate.     Neck: Neck supple. No JVD present. No thyromegaly present. No lymphadenopathy present.  Cardiovascular: Regular rate, regular rhythm, S1 normal and S2 normal.  Exam reveals no gallop and no friction rub.  No murmur heard.  Pulmonary/Chest: Lungs are clear to auscultation bilaterally. No respiratory distress. No wheezes. No rhonchi. No rales.   Abdominal: Soft. Bowel sounds are normal. No distension and no mass. There is no hepatosplenomegaly. There is no tenderness.   Musculoskeletal: Normal Muscle tone  Extremities: No edema. Pulses are palpable in all 4 extremities.  Neurological: Patient is alert and oriented to person, place, and time. Cranial nerves II-XII are grossly intact with no focal deficits.  Skin: Skin is warm. No rash noted. Nails show no clubbing.  No cyanosis or erythema.    Debilities/Disabilities Identified: None  Emotional Behavior: Appropriate     Results Review:   I reviewed the patient's new clinical results.    Lab Results (most recent)       None            Imaging Results (Most Recent)       None          reviewed    ECG/EMG Results (most recent)       None          reviewed    Assessment & Plan   Personal hx colon polyps and dyspepsia/  EGD and colonoscopy      I discussed the patient's findings and my recommendations with patient.     Odilon Wayne MD  01/08/24  11:57 EST    Time: 10 min prior to procedure.

## 2024-01-08 NOTE — ANESTHESIA PREPROCEDURE EVALUATION
Anesthesia Evaluation     Patient summary reviewed and Nursing notes reviewed   no history of anesthetic complications:   NPO Solid Status: > 8 hours  NPO Liquid Status: > 8 hours           Airway   Mallampati: II  TM distance: >3 FB  Neck ROM: full  No difficulty expected  Dental    (+) upper dentures    Pulmonary     breath sounds clear to auscultation  (+) a smoker Current, Smoked day of surgery, COPD (had breathing treatment today) moderate, asthma,shortness of breath  Cardiovascular   Exercise tolerance: good (4-7 METS)    ECG reviewed  Rhythm: regular  Rate: normal    (+) hypertension well controlled less than 2 medications, OLMOS, DVT (right LE) resolved    ROS comment: RR Interval= 674 ms  FL Interval= 164 ms  QRSD Interval= 84 ms  QT Interval= 376 ms  QTc Interval= 458 ms  Heart Rate= 89 ms  P Axis= 73 deg  QRS Axis= 54 deg  T Wave Axis= 46 deg  I: 40 Axis= 25 deg  T: 40 Axis= 74 deg  ST Axis= 229 deg  SINUS RHYTHM  BORDERLINE LOW VOLTAGE IN FRONTAL LEADS  NO PRIOR TRACING AVAILABLE FOR COMPARISON  Electronically Signed by:  Bertin Miller MD 26-Oct-2016 14:50:55  Date and Time of Study: 2016-10-26 14:26:15      Neuro/Psych  (+) seizures (pt taken of meds no sz in 4yrs per pt), headaches, numbness (right hand pt states fingers stay numb), psychiatric history Anxiety and Depression  GI/Hepatic/Renal/Endo    (+) GERD well controlled    Musculoskeletal     (+) back pain  Abdominal    Substance History - negative use     OB/GYN negative ob/gyn ROS         Other   arthritis,                   Anesthesia Plan    ASA 3     MAC     intravenous induction     Anesthetic plan, risks, benefits, and alternatives have been provided, discussed and informed consent has been obtained with: patient.    Use of blood products discussed with patient  Consented to blood products.      CODE STATUS:

## 2024-01-08 NOTE — ANESTHESIA POSTPROCEDURE EVALUATION
Patient: Sarkis Campbell    Procedure Summary       Date: 01/08/24 Room / Location: [facility] ENDOSCOPY 2 /  [facility]    Anesthesia Start: 1154 Anesthesia Stop: 1231    Procedures:       ESOPHAGOGASTRODUODENOSCOPY WITH BIOPSY (Esophagus)      COLONOSCOPY Diagnosis:       Dyspepsia      Personal history of colonic polyps      (Dyspepsia [R10.13])      (Personal history of colonic polyps [Z86.010])    Surgeons: Odilon Wayne MD Provider: Lori Eid CRNA    Anesthesia Type: MAC ASA Status: 3            Anesthesia Type: MAC    Vitals  Vitals Value Taken Time   BP 94/75 01/08/24 1250   Temp     Pulse 87 01/08/24 1254   Resp 16 01/08/24 1240   SpO2 97 % 01/08/24 1254   Vitals shown include unfiled device data.        Post Anesthesia Care and Evaluation    Patient location during evaluation: PHASE II  Patient participation: complete - patient participated  Level of consciousness: awake  Pain score: 0  Pain management: adequate    Airway patency: patent  Anesthetic complications: No anesthetic complications  PONV Status: none  Cardiovascular status: acceptable  Respiratory status: acceptable  Hydration status: acceptable

## 2024-01-09 ENCOUNTER — TELEPHONE (OUTPATIENT)
Dept: GASTROENTEROLOGY | Facility: CLINIC | Age: 59
End: 2024-01-09
Payer: COMMERCIAL

## 2024-01-09 NOTE — TELEPHONE ENCOUNTER
The request has been approved. The authorization is effective from 01/09/2024 to 01/08/2025, as long as the member is enrolled in their current health plan. The request was approved as submitted. A written notification letter will follow with additional details.

## 2024-01-11 LAB
LAB AP CASE REPORT: NORMAL
LAB AP SPECIAL STAINS: NORMAL
PATH REPORT.FINAL DX SPEC: NORMAL
PATH REPORT.GROSS SPEC: NORMAL

## 2024-01-18 NOTE — PROGRESS NOTES
- EGD for dyspepsia.   - Findings/path: gastritis (biopsied -- negative for H Pylori)   - POC: Etiology of dyspepsia could be due to moderate severity gastritis seen. Continue Omeprazole 40 mg BID as symptoms have improved on current medication regimen.    - C/s for personal history of colon polyps  - Findings: poor prep, no polyps seen   - POC: Repeat colonoscopy in 6 months with 2 day bowel prep

## 2024-01-24 ENCOUNTER — TELEPHONE (OUTPATIENT)
Dept: GASTROENTEROLOGY | Facility: CLINIC | Age: 59
End: 2024-01-24
Payer: COMMERCIAL

## 2024-01-24 DIAGNOSIS — K58.1 IRRITABLE BOWEL SYNDROME WITH CONSTIPATION: Primary | ICD-10-CM

## 2024-01-24 RX ORDER — SODIUM PHOSPHATE, DIBASIC AND SODIUM PHOSPHATE, MONOBASIC 7; 19 G/230ML; G/230ML
1 ENEMA RECTAL DAILY PRN
Qty: 133 ML | Refills: 0 | Status: SHIPPED | OUTPATIENT
Start: 2024-01-24

## 2024-01-24 RX ORDER — TENAPANOR HYDROCHLORIDE 53.2 MG/1
50 TABLET ORAL 2 TIMES DAILY
Qty: 60 TABLET | Refills: 11 | Status: SHIPPED | OUTPATIENT
Start: 2024-01-24

## 2024-01-24 NOTE — TELEPHONE ENCOUNTER
Pt taking Linzess daily     Last BM 1-8-24 prior to C.S confirmed No Bowel Movement since then    Abdominal Pain, no vomiting, some nausea

## 2024-01-24 NOTE — TELEPHONE ENCOUNTER
I have prescribed some Fleet enemas PRN to help induce a bowel movement sooner. I will also transition her from Linzess 145 mcg QD to Ibsrela 50 mg BID. While she waits to receive the Ibsrela, she can take 2 tabs of Linzess per day (Linzess 290 mcg QD). Thank you

## 2024-02-01 ENCOUNTER — TELEPHONE (OUTPATIENT)
Dept: GASTROENTEROLOGY | Facility: CLINIC | Age: 59
End: 2024-02-01
Payer: COMMERCIAL

## 2024-02-01 NOTE — TELEPHONE ENCOUNTER
PA request for Ibsrela sent through cm  If denied- need send rx to transition pharmacy to trigger ardylx program

## 2024-02-08 ENCOUNTER — TELEPHONE (OUTPATIENT)
Dept: GASTROENTEROLOGY | Facility: CLINIC | Age: 59
End: 2024-02-08
Payer: COMMERCIAL

## 2024-02-08 NOTE — TELEPHONE ENCOUNTER
[facility] pharmacist called for medication clarification. Per 1/24/24  note per Dr. Wayne, advised [facility] that Ibsrela is a replacement for Linzess, NOT in addition to the Linzess.  RPh voiced understanding.

## 2024-03-01 ENCOUNTER — TELEPHONE (OUTPATIENT)
Dept: GASTROENTEROLOGY | Facility: CLINIC | Age: 59
End: 2024-03-01
Payer: COMMERCIAL

## 2024-03-01 NOTE — TELEPHONE ENCOUNTER
Tried to call patient in regards to missed appointment on 2/29/2024. Mailbox was full and could not leave message. Will send no show letter.

## 2024-03-11 ENCOUNTER — TELEPHONE (OUTPATIENT)
Dept: GASTROENTEROLOGY | Facility: CLINIC | Age: 59
End: 2024-03-11

## 2024-03-11 NOTE — TELEPHONE ENCOUNTER
Caller: Sarkis Campbell    Relationship: Self    Best call back number: [phone]     What was the call regarding: PT IS ON A MEDICATION BUT CAN'T REMEMBER THE NAME AND WILL BE CONSTIPATED ONE DAY AND THEN HAVE DIARRHEA THE NEXT. DIARRHEA HAS LASTED ABOUT 4-5 DAYS NOW. PLEASE CALL PT BACK AND ADVISE

## 2024-03-12 NOTE — TELEPHONE ENCOUNTER
Recommend decreasing to Ibsrela 50 mg daily or every other day to decrease the frequency of diarrhea while keeping her bowel movements regular.

## 2024-03-12 NOTE — TELEPHONE ENCOUNTER
Patient has been advised to decrease Ibsrela 50 mg daily instead of twice daily.   Patient gave verbal understanding.

## 2024-04-11 ENCOUNTER — OFFICE VISIT (OUTPATIENT)
Age: 59
End: 2024-04-11
Payer: COMMERCIAL

## 2024-04-11 VITALS
SYSTOLIC BLOOD PRESSURE: 120 MMHG | HEIGHT: 59 IN | DIASTOLIC BLOOD PRESSURE: 88 MMHG | BODY MASS INDEX: 27.21 KG/M2 | WEIGHT: 135 LBS

## 2024-04-11 DIAGNOSIS — K58.1 IRRITABLE BOWEL SYNDROME WITH CONSTIPATION: Primary | ICD-10-CM

## 2024-04-11 DIAGNOSIS — R10.13 DYSPEPSIA: ICD-10-CM

## 2024-04-11 DIAGNOSIS — K21.00 GASTROESOPHAGEAL REFLUX DISEASE WITH ESOPHAGITIS WITHOUT HEMORRHAGE: ICD-10-CM

## 2024-04-11 DIAGNOSIS — Z86.010 PERSONAL HISTORY OF COLONIC POLYPS: ICD-10-CM

## 2024-04-11 RX ORDER — PLECANATIDE 3 MG/1
3 TABLET ORAL DAILY
Qty: 20 TABLET | Refills: 0 | COMMUNITY
Start: 2024-04-11

## 2024-04-11 RX ORDER — PLECANATIDE 3 MG/1
3 TABLET ORAL DAILY
Qty: 30 TABLET | Refills: 11 | Status: SHIPPED | OUTPATIENT
Start: 2024-04-11

## 2024-04-11 NOTE — ASSESSMENT & PLAN NOTE
- Will transition from Ibsrela 50 mg QD to Trulance 3 mg QD due to persistent diarrhea with Ibsrela   - Samples for Trulance provided during encounter

## 2024-04-11 NOTE — PROGRESS NOTES
Sarkis Campbell is a 59 y.o. female with PMH of IBS-C, COPD, Asthma, DVT on Xarelto who presents with   Chief Complaint   Patient presents with    Irritable Bowel Syndrome    Heartburn       Subjective     # IBS-C   # Personal history of colon polyps  - Following her last appointment, initially was on Linzess 145 mcg QD but had minimal improvement of her constipation. She then started on Ibsrela 50 mg BID which was decreased to Ibsrela 50 mg QD but has continued to have diarrhea with use.   - Reports her abdominal pain/bloating has significantly improved.   - Last c/s on 1/8 was a poor prep. Re-scheduled to 7/8/24.   - Previous colonoscopy in 5/2017 had 4 sub-cm polyps removed (3 were tubular adenomas and one hyperplastic polyp). Surveillance c/s was due in 5/2022.      # Dyspepsia   - EGD on 1/8 showed moderate severity gastritis (biopsied -- negative for H Pylori).   - Adherent to Omeprazole 40 mg BID. Denies epigastric pain.     # GERD   - Adherent to Omeprazole 40 mg BID. Denies heartburn.   - Last EGD on 1/8 had no esophagitis.             Past Medical History:   Diagnosis Date    Anxiety     Asthma     Back pain     COPD (chronic obstructive pulmonary disease)     Depression     DVT (deep venous thrombosis)     Epilepsy     Osteoarthritis     Seasonal allergies     Seizures        Social History     Socioeconomic History    Marital status:    Tobacco Use    Smoking status: Every Day     Current packs/day: 3.00     Types: Cigarettes     Passive exposure: Current    Smokeless tobacco: Never    Tobacco comments:     3-4 packs/day since 2005   Vaping Use    Vaping status: Former   Substance and Sexual Activity    Alcohol use: No    Drug use: No    Sexual activity: Defer     Birth control/protection: Abstinence         Current Outpatient Medications:     Aclidinium Bromide (TUDORZA PRESSAIR IN), Inhale., Disp: , Rfl:     albuterol (PROVENTIL HFA;VENTOLIN HFA) 108 (90 BASE) MCG/ACT inhaler, Inhale 2 puffs  Every 4 (Four) Hours As Needed for Wheezing., Disp: , Rfl:     ARIPiprazole (ABILIFY) 5 MG tablet, Take  by mouth Daily., Disp: , Rfl:     Banophen 25 MG tablet, Take 1 tablet by mouth At Night As Needed., Disp: , Rfl:     CarBAMazepine ER, Antipsych, 300 MG capsule sustained-release 12 hr, Take  by mouth 2 (two) times a day., Disp: , Rfl:     citalopram (CeleXA) 20 MG tablet, Take 1 tablet by mouth Daily., Disp: , Rfl:     DULoxetine (CYMBALTA) 30 MG capsule, Take 1 capsule by mouth Daily., Disp: , Rfl:     fluticasone-salmeterol (ADVAIR) 250-50 MCG/DOSE DISKUS, Inhale 2 (two) times a day., Disp: , Rfl:     lisinopril (PRINIVIL,ZESTRIL) 2.5 MG tablet, Take 1 tablet by mouth Daily., Disp: , Rfl:     loratadine (CLARITIN) 10 MG tablet, Take 1 tablet by mouth Daily., Disp: , Rfl:     omeprazole (PrilOSEC) 40 MG capsule, Take 1 capsule by mouth 2 (Two) Times a Day Before Meals., Disp: 180 capsule, Rfl: 3    pramipexole (MIRAPEX) 0.25 MG tablet, Take 1 tablet by mouth., Disp: , Rfl:     Sodium Phosphates (Fleet Enema) enema, Insert 1 application  into the rectum Daily As Needed (constipation) for up to 14 doses., Disp: 133 mL, Rfl: 0    venlafaxine (EFFEXOR) 75 MG tablet, Take 1 tablet by mouth 2 (Two) Times a Day., Disp: , Rfl:     Plecanatide (Trulance) 3 MG tablet, Take 1 tablet by mouth Daily., Disp: 30 tablet, Rfl: 11    Plecanatide (Trulance) 3 MG tablet, Take 1 tablet by mouth Daily., Disp: 20 tablet, Rfl: 0    Objective   Vitals:    04/11/24 1358   BP: 120/88         04/11/24  1358   Weight: 61.2 kg (135 lb)     Body mass index is 27.25 kg/m².      Physical Exam  Vitals reviewed.   Constitutional:       Appearance: Normal appearance.   HENT:      Head: Normocephalic and atraumatic.   Eyes:      Extraocular Movements: Extraocular movements intact.      Conjunctiva/sclera: Conjunctivae normal.   Cardiovascular:      Rate and Rhythm: Normal rate and regular rhythm.      Heart sounds: Normal heart sounds.    Pulmonary:      Effort: Pulmonary effort is normal.      Breath sounds: Normal breath sounds.   Abdominal:      General: Abdomen is flat. Bowel sounds are normal. There is no distension.      Palpations: Abdomen is soft.      Tenderness: There is abdominal tenderness (mild).   Neurological:      Mental Status: She is alert.   Psychiatric:         Mood and Affect: Mood normal.         Behavior: Behavior normal.         WBC   Date Value Ref Range Status   10/26/2016 5.56 4.80 - 10.80 10*3/mm3 Final     RBC   Date Value Ref Range Status   10/26/2016 3.95 (L) 4.20 - 5.40 10*6/mm3 Final     Hemoglobin   Date Value Ref Range Status   10/26/2016 12.8 12.0 - 16.0 g/dL Final     Hematocrit   Date Value Ref Range Status   10/26/2016 38.0 37.0 - 47.0 % Final     MCV   Date Value Ref Range Status   10/26/2016 96.2 81.0 - 99.0 fL Final     MCH   Date Value Ref Range Status   10/26/2016 32.4 (H) 27.0 - 31.0 pg Final     MCHC   Date Value Ref Range Status   10/26/2016 33.7 31.0 - 37.0 g/dL Final     RDW   Date Value Ref Range Status   10/26/2016 17.9 (H) 11.5 - 14.5 % Final     RDW-SD   Date Value Ref Range Status   10/26/2016 63.6 (H) 37.0 - 54.0 fl Final     MPV   Date Value Ref Range Status   10/26/2016 10.6 (H) 7.4 - 10.4 fL Final     Platelets   Date Value Ref Range Status   10/26/2016 213 140 - 500 10*3/mm3 Final     Neutrophil %   Date Value Ref Range Status   10/26/2016 52.5 45.0 - 70.0 % Final     Lymphocyte %   Date Value Ref Range Status   10/26/2016 38.3 20.0 - 45.0 % Final     Monocyte %   Date Value Ref Range Status   10/26/2016 7.6 3.0 - 8.0 % Final     Eosinophil %   Date Value Ref Range Status   10/26/2016 0.0 0.0 - 4.0 % Final     Basophil %   Date Value Ref Range Status   10/26/2016 1.6 0.0 - 2.0 % Final     Immature Grans %   Date Value Ref Range Status   10/26/2016 0.0 0.0 - 0.5 % Final     Neutrophils, Absolute   Date Value Ref Range Status   10/26/2016 2.92 1.50 - 8.30 10*3/mm3 Final     Lymphocytes,  Absolute   Date Value Ref Range Status   10/26/2016 2.13 0.60 - 4.80 10*3/mm3 Final     Monocytes, Absolute   Date Value Ref Range Status   10/26/2016 0.42 0.00 - 1.00 10*3/mm3 Final     Eosinophils, Absolute   Date Value Ref Range Status   10/26/2016 0.00 (L) 0.10 - 0.30 10*3/mm3 Final     Basophils, Absolute   Date Value Ref Range Status   10/26/2016 0.09 0.00 - 0.20 10*3/mm3 Final     Immature Grans, Absolute   Date Value Ref Range Status   10/26/2016 0.00 0.00 - 0.03 10*3/mm3 Final     nRBC   Date Value Ref Range Status   10/26/2016 0.0 0.0 - 0.0 /100 WBC Final       Lab Results   Component Value Date    GLUCOSE 80 10/26/2016    BUN 9 11/19/2021    CREATININE 0.7 11/19/2021    EGFRIFNONA 102 10/26/2016    BCR 13.0 11/19/2021    CO2 24 11/19/2021    CALCIUM 9.3 11/19/2021    ALBUMIN 4.3 11/19/2021    LABIL2 1.8 11/19/2021    AST 20 11/19/2021    ALT 11 (L) 11/19/2021         Imaging Results (Last 7 Days)       ** No results found for the last 168 hours. **              Assessment & Plan   Diagnoses and all orders for this visit:    1. Irritable bowel syndrome with constipation (Primary)  Assessment & Plan:  - Will transition from Ibsrela 50 mg QD to Trulance 3 mg QD due to persistent diarrhea with Ibsrela   - Samples for Trulance provided during encounter       Orders:  -     Plecanatide (Trulance) 3 MG tablet; Take 1 tablet by mouth Daily.  Dispense: 30 tablet; Refill: 11    2. Dyspepsia  Assessment & Plan:  - Controlled   - Continue Omeprazole 40 mg BID       3. Personal history of colonic polyps  Assessment & Plan:  - C/s scheduled for 7/8/24 with Plenvu prep       4. Gastroesophageal reflux disease with esophagitis without hemorrhage  Assessment & Plan:  - Controlled. Continue Omeprazole 40 mg BID       Other orders  -     Plecanatide (Trulance) 3 MG tablet; Take 1 tablet by mouth Daily.  Dispense: 20 tablet; Refill: 0      RTC in 4 months     I have discussed the above plan with the patient.  They verbalize  understanding and are in agreement with the plan.  They have been advised to contact the office for any questions, concerns, or changes related to their health.

## 2024-04-17 ENCOUNTER — TELEPHONE (OUTPATIENT)
Dept: GASTROENTEROLOGY | Facility: CLINIC | Age: 59
End: 2024-04-17
Payer: COMMERCIAL

## 2024-04-17 NOTE — TELEPHONE ENCOUNTER
Tried to call patient to let her know that we had the Trulance samples and Plenvu, call would not go through. It was the only number listed.

## 2024-06-27 ENCOUNTER — TELEPHONE (OUTPATIENT)
Dept: GASTROENTEROLOGY | Facility: CLINIC | Age: 59
End: 2024-06-27
Payer: COMMERCIAL

## 2024-06-27 NOTE — TELEPHONE ENCOUNTER
Pt picked up sample Plenvu today  Needs for scheduling to call to review instructions and confirm times

## 2024-07-05 ENCOUNTER — ANESTHESIA EVENT (OUTPATIENT)
Dept: PERIOP | Facility: HOSPITAL | Age: 59
End: 2024-07-05
Payer: COMMERCIAL

## 2024-07-08 ENCOUNTER — ANESTHESIA (OUTPATIENT)
Dept: PERIOP | Facility: HOSPITAL | Age: 59
End: 2024-07-08
Payer: COMMERCIAL

## 2024-07-08 ENCOUNTER — HOSPITAL ENCOUNTER (OUTPATIENT)
Facility: HOSPITAL | Age: 59
Setting detail: HOSPITAL OUTPATIENT SURGERY
Discharge: HOME | End: 2024-07-08
Attending: STUDENT IN AN ORGANIZED HEALTH CARE EDUCATION/TRAINING PROGRAM | Admitting: STUDENT IN AN ORGANIZED HEALTH CARE EDUCATION/TRAINING PROGRAM
Payer: COMMERCIAL

## 2024-07-08 VITALS
RESPIRATION RATE: 27 BRPM | HEART RATE: 73 BPM | BODY MASS INDEX: 25.35 KG/M2 | WEIGHT: 125.6 LBS | TEMPERATURE: 98 F | DIASTOLIC BLOOD PRESSURE: 77 MMHG | OXYGEN SATURATION: 99 % | SYSTOLIC BLOOD PRESSURE: 130 MMHG

## 2024-07-08 PROCEDURE — 25010000002 PROPOFOL 200 MG/20ML EMULSION

## 2024-07-08 PROCEDURE — 43239 EGD BIOPSY SINGLE/MULTIPLE: CPT | Performed by: STUDENT IN AN ORGANIZED HEALTH CARE EDUCATION/TRAINING PROGRAM

## 2024-07-08 PROCEDURE — 25810000003 LACTATED RINGERS PER 1000 ML: Performed by: NURSE ANESTHETIST, CERTIFIED REGISTERED

## 2024-07-08 PROCEDURE — 94640 AIRWAY INHALATION TREATMENT: CPT

## 2024-07-08 PROCEDURE — 25010000002 PHENYLEPHRINE 10 MG/ML SOLUTION

## 2024-07-08 PROCEDURE — 45378 DIAGNOSTIC COLONOSCOPY: CPT | Performed by: STUDENT IN AN ORGANIZED HEALTH CARE EDUCATION/TRAINING PROGRAM

## 2024-07-08 RX ORDER — IPRATROPIUM BROMIDE AND ALBUTEROL SULFATE 2.5; .5 MG/3ML; MG/3ML
3 SOLUTION RESPIRATORY (INHALATION) ONCE
Status: COMPLETED | OUTPATIENT
Start: 2024-07-08 | End: 2024-07-08

## 2024-07-08 RX ORDER — SODIUM CHLORIDE 9 MG/ML
40 INJECTION, SOLUTION INTRAVENOUS AS NEEDED
Status: DISCONTINUED | OUTPATIENT
Start: 2024-07-08 | End: 2024-07-08 | Stop reason: HOSPADM

## 2024-07-08 RX ORDER — SODIUM CHLORIDE 0.9 % (FLUSH) 0.9 %
10 SYRINGE (ML) INJECTION EVERY 12 HOURS SCHEDULED
Status: DISCONTINUED | OUTPATIENT
Start: 2024-07-08 | End: 2024-07-08 | Stop reason: HOSPADM

## 2024-07-08 RX ORDER — LIDOCAINE HYDROCHLORIDE 20 MG/ML
INJECTION, SOLUTION INFILTRATION; PERINEURAL AS NEEDED
Status: DISCONTINUED | OUTPATIENT
Start: 2024-07-08 | End: 2024-07-08 | Stop reason: SURG

## 2024-07-08 RX ORDER — SODIUM CHLORIDE, SODIUM LACTATE, POTASSIUM CHLORIDE, CALCIUM CHLORIDE 600; 310; 30; 20 MG/100ML; MG/100ML; MG/100ML; MG/100ML
100 INJECTION, SOLUTION INTRAVENOUS ONCE
Status: DISCONTINUED | OUTPATIENT
Start: 2024-07-08 | End: 2024-07-08 | Stop reason: HOSPADM

## 2024-07-08 RX ORDER — ONDANSETRON 2 MG/ML
4 INJECTION INTRAMUSCULAR; INTRAVENOUS ONCE AS NEEDED
Status: DISCONTINUED | OUTPATIENT
Start: 2024-07-08 | End: 2024-07-08 | Stop reason: HOSPADM

## 2024-07-08 RX ORDER — PROPOFOL 10 MG/ML
INJECTION, EMULSION INTRAVENOUS AS NEEDED
Status: DISCONTINUED | OUTPATIENT
Start: 2024-07-08 | End: 2024-07-08 | Stop reason: SURG

## 2024-07-08 RX ORDER — PHENYLEPHRINE HYDROCHLORIDE 10 MG/ML
INJECTION INTRAVENOUS AS NEEDED
Status: DISCONTINUED | OUTPATIENT
Start: 2024-07-08 | End: 2024-07-08 | Stop reason: SURG

## 2024-07-08 RX ORDER — LIDOCAINE HYDROCHLORIDE 10 MG/ML
0.5 INJECTION, SOLUTION EPIDURAL; INFILTRATION; INTRACAUDAL; PERINEURAL ONCE AS NEEDED
Status: DISCONTINUED | OUTPATIENT
Start: 2024-07-08 | End: 2024-07-08 | Stop reason: HOSPADM

## 2024-07-08 RX ORDER — DEXMEDETOMIDINE HYDROCHLORIDE 100 UG/ML
INJECTION, SOLUTION INTRAVENOUS AS NEEDED
Status: DISCONTINUED | OUTPATIENT
Start: 2024-07-08 | End: 2024-07-08 | Stop reason: SURG

## 2024-07-08 RX ORDER — SODIUM CHLORIDE, SODIUM LACTATE, POTASSIUM CHLORIDE, CALCIUM CHLORIDE 600; 310; 30; 20 MG/100ML; MG/100ML; MG/100ML; MG/100ML
9 INJECTION, SOLUTION INTRAVENOUS CONTINUOUS
Status: DISCONTINUED | OUTPATIENT
Start: 2024-07-08 | End: 2024-07-08 | Stop reason: HOSPADM

## 2024-07-08 RX ORDER — SODIUM CHLORIDE 0.9 % (FLUSH) 0.9 %
10 SYRINGE (ML) INJECTION AS NEEDED
Status: DISCONTINUED | OUTPATIENT
Start: 2024-07-08 | End: 2024-07-08 | Stop reason: HOSPADM

## 2024-07-08 RX ADMIN — IPRATROPIUM BROMIDE AND ALBUTEROL SULFATE 3 ML: .5; 3 SOLUTION RESPIRATORY (INHALATION) at 11:28

## 2024-07-08 RX ADMIN — PHENYLEPHRINE HYDROCHLORIDE 100 MCG: 10 INJECTION INTRAVENOUS at 10:53

## 2024-07-08 RX ADMIN — DEXMEDETOMIDINE 8 MCG: 200 INJECTION, SOLUTION INTRAVENOUS at 10:31

## 2024-07-08 RX ADMIN — PROPOFOL 40 MG: 10 INJECTION, EMULSION INTRAVENOUS at 10:45

## 2024-07-08 RX ADMIN — LIDOCAINE HYDROCHLORIDE 50 MG: 20 INJECTION, SOLUTION INFILTRATION; PERINEURAL at 10:31

## 2024-07-08 RX ADMIN — PROPOFOL 170 MG: 10 INJECTION, EMULSION INTRAVENOUS at 10:33

## 2024-07-08 RX ADMIN — DEXMEDETOMIDINE 4 MCG: 200 INJECTION, SOLUTION INTRAVENOUS at 10:43

## 2024-07-08 RX ADMIN — PROPOFOL 50 MG: 10 INJECTION, EMULSION INTRAVENOUS at 10:31

## 2024-07-08 RX ADMIN — SODIUM CHLORIDE, POTASSIUM CHLORIDE, SODIUM LACTATE AND CALCIUM CHLORIDE: 600; 310; 30; 20 INJECTION, SOLUTION INTRAVENOUS at 10:20

## 2024-07-08 RX ADMIN — PROPOFOL 100 MG: 10 INJECTION, EMULSION INTRAVENOUS at 10:48

## 2024-07-08 NOTE — ANESTHESIA PREPROCEDURE EVALUATION
Anesthesia Evaluation     Patient summary reviewed and Nursing notes reviewed   no history of anesthetic complications:   NPO Solid Status: > 8 hours  NPO Liquid Status: > 8 hours           Airway   Mallampati: II  TM distance: >3 FB  Neck ROM: full  No difficulty expected  Dental    (+) upper dentures    Pulmonary     breath sounds clear to auscultation  (+) a smoker (1.5ppd (was smoking 3-4ppd) x 40 years) Current, Smoked day of surgery, cigarettes, COPD moderate, asthma (used inhaler this morning),shortness of breath  Cardiovascular   Exercise tolerance: good (4-7 METS)    ECG reviewed  Rhythm: regular  Rate: normal    (+) hypertension well controlled less than 2 medications, OLMOS, DVT (right LE) resolved    ROS comment: RR Interval= 674 ms  ID Interval= 164 ms  QRSD Interval= 84 ms  QT Interval= 376 ms  QTc Interval= 458 ms  Heart Rate= 89 ms  P Axis= 73 deg  QRS Axis= 54 deg  T Wave Axis= 46 deg  I: 40 Axis= 25 deg  T: 40 Axis= 74 deg  ST Axis= 229 deg  SINUS RHYTHM  BORDERLINE LOW VOLTAGE IN FRONTAL LEADS  NO PRIOR TRACING AVAILABLE FOR COMPARISON  Electronically Signed by:  Bertin Miller MD 26-Oct-2016 14:50:55  Date and Time of Study: 2016-10-26 14:26:15      Neuro/Psych  (+) seizures (pt taken of meds no sz in 4yrs per pt), headaches, numbness (right hand pt states fingers stay numb), psychiatric history Anxiety and Depression  GI/Hepatic/Renal/Endo    (+) GERD well controlled    Musculoskeletal     (+) back pain  Abdominal    Substance History - negative use     OB/GYN negative ob/gyn ROS         Other   arthritis,                   Anesthesia Plan    ASA 3     MAC     intravenous induction     Anesthetic plan, risks, benefits, and alternatives have been provided, discussed and informed consent has been obtained with: patient.    Use of blood products discussed with patient  Consented to blood products.        CODE STATUS:

## 2024-07-08 NOTE — ANESTHESIA POSTPROCEDURE EVALUATION
Patient: Sarkis Campbell    Procedure Summary       Date: 07/08/24 Room / Location: [facility] ENDOSCOPY 2 /  [facility]    Anesthesia Start: 1020 Anesthesia Stop: 1111    Procedures:       COLONOSCOPY      ESOPHAGOGASTRODUODENOSCOPY (Esophagus) Diagnosis:       Personal history of colonic polyps      (Personal history of colonic polyps [Z86.010])    Surgeons: Odilon Wayne MD Provider: Blayne Peña CRNA    Anesthesia Type: MAC ASA Status: 3            Anesthesia Type: MAC    Vitals  Vitals Value Taken Time   /77 07/08/24 1140   Temp 98 °F (36.7 °C) 07/08/24 1107   Pulse 67 07/08/24 1147   Resp 27 07/08/24 1140   SpO2 96 % 07/08/24 1147   Vitals shown include unfiled device data.        Post Anesthesia Care and Evaluation    Patient location during evaluation: PHASE II  Patient participation: complete - patient participated  Level of consciousness: awake  Pain score: 0  Pain management: adequate    Airway patency: patent  Anesthetic complications: No anesthetic complications  PONV Status: none  Cardiovascular status: acceptable  Respiratory status: acceptable  Hydration status: acceptable    Comments: Patient had albuterol puffs and duoneb treatment in recovery. Is a long time heavy smoker with COPD. After treatments, is much improved. Stable and at baseline.

## 2024-07-08 NOTE — H&P
Patient Care Team:  Patience Abbott APRN as PCP - General (Nurse Practitioner)    CHIEF COMPLAINT: Personal hx colon polyps and dysphagia    HISTORY OF PRESENT ILLNESS:  EGD for dysphagia.  C/s for personal history of colon polyps.     Past Medical History:   Diagnosis Date    Anxiety     Asthma     Back pain     COPD (chronic obstructive pulmonary disease)     Depression     DVT (deep venous thrombosis)     Epilepsy     Osteoarthritis     Seasonal allergies     Seizures      Past Surgical History:   Procedure Laterality Date    BLADDER SURGERY      CARPAL TUNNEL RELEASE      CHOLECYSTECTOMY      COLONOSCOPY N/A 5/12/2017    Procedure: COLONOSCOPY;  Surgeon: Steven Flynn MD;  Location: [facility] OR;  Service:     COLONOSCOPY N/A 1/8/2024    Procedure: COLONOSCOPY;  Surgeon: Odilon Wayne MD;  Location: [facility] OR;  Service: Gastroenterology;  Laterality: N/A;  Poor Prep; external hemorrhoids    CYST REMOVAL Right     foot    ENDOSCOPY N/A 5/12/2017    Procedure: ESOPHAGOGASTRODUODENOSCOPY, biopsy;  Surgeon: Steven Flynn MD;  Location: [facility] OR;  Service:     ENDOSCOPY N/A 1/8/2024    Procedure: ESOPHAGOGASTRODUODENOSCOPY WITH BIOPSY;  Surgeon: Odilon Wayne MD;  Location: [facility] OR;  Service: Gastroenterology;  Laterality: N/A;  gastritis    ERCP      HYSTERECTOMY      MI ERCP DX COLLECTION SPECIMEN BRUSHING/WASHING N/A 10/26/2016    Procedure: ENDOSCOPIC RETROGRADE CHOLANGIOPANCREATOGRAPHY dilation common bile duct;  Surgeon: Steven Flynn MD;  Location: [facility] OR;  Service: Gastroenterology    TRIGGER FINGER RELEASE       Family History   Problem Relation Age of Onset    Cancer Mother     Malig Hyperthermia Neg Hx      Social History     Tobacco Use    Smoking status: Every Day     Current packs/day: 1.50     Types: Cigarettes     Passive exposure: Current    Smokeless tobacco: Never    Tobacco comments:     1.5per day   Vaping Use    Vaping status: Former    Substance Use Topics    Alcohol use: No    Drug use: No     Medications Prior to Admission   Medication Sig Dispense Refill Last Dose    Aclidinium Bromide (TUDORZA PRESSAIR IN) Inhale.   Past Week    albuterol (PROVENTIL HFA;VENTOLIN HFA) 108 (90 BASE) MCG/ACT inhaler Inhale 2 puffs Every 4 (Four) Hours As Needed for Wheezing.   7/8/2024    ARIPiprazole (ABILIFY) 5 MG tablet Take  by mouth Daily.   Past Week    Banophen 25 MG tablet Take 1 tablet by mouth At Night As Needed.   Past Week    CarBAMazepine ER, Antipsych, 300 MG capsule sustained-release 12 hr Take  by mouth 2 (two) times a day.   Past Week    citalopram (CeleXA) 20 MG tablet Take 1 tablet by mouth Daily.   Past Week    DULoxetine (CYMBALTA) 30 MG capsule Take 1 capsule by mouth Daily.   Past Week    fluticasone-salmeterol (ADVAIR) 250-50 MCG/DOSE DISKUS Inhale 2 (two) times a day.   7/7/2024    lisinopril (PRINIVIL,ZESTRIL) 2.5 MG tablet Take 1 tablet by mouth Daily.   Past Week    loratadine (CLARITIN) 10 MG tablet Take 1 tablet by mouth Daily.   Past Week    omeprazole (PrilOSEC) 40 MG capsule Take 1 capsule by mouth 2 (Two) Times a Day Before Meals. 180 capsule 3 Past Week    Plecanatide (Trulance) 3 MG tablet Take 1 tablet by mouth Daily. 30 tablet 11 Past Week    pramipexole (MIRAPEX) 0.25 MG tablet Take 1 tablet by mouth.   Past Week    venlafaxine (EFFEXOR) 75 MG tablet Take 1 tablet by mouth 2 (Two) Times a Day.   Past Week    Plecanatide (Trulance) 3 MG tablet Take 1 tablet by mouth Daily. 20 tablet 0     Sodium Phosphates (Fleet Enema) enema Insert 1 application  into the rectum Daily As Needed (constipation) for up to 14 doses. 133 mL 0 Unknown     Allergies:  Codeine, Darvon [propoxyphene], Sulfamethoxazole-trimethoprim, Tramadol, Tylenol [acetaminophen], and Zithromax [azithromycin]    REVIEW OF SYSTEMS:  Please see the above history of present illness for pertinent positives and negatives.  The remainder of the patient's systems have  been reviewed and are negative.     Vital Signs  Temp:  [98.6 °F (37 °C)] 98.6 °F (37 °C)  Heart Rate:  [77] 77  Resp:  [16] 16  BP: (120)/(99) 120/99    Flowsheet Rows      Flowsheet Row First Filed Value   Admission Height --   Admission Weight 57 kg (125 lb 9.6 oz) Documented at 07/08/2024 0957             Physical Exam:  Physical Exam   Constitutional: Patient appears well-developed and well-nourished and in no acute distress   HEENT:   Head: Normocephalic and atraumatic.   Eyes:  Pupils are equal, round, and reactive to light. EOM are intact. Sclerae are anicteric and non-injected.  Mouth and Throat: Patient has moist mucous membranes. Oropharynx is clear of any erythema or exudate.     Neck: Neck supple. No JVD present. No thyromegaly present. No lymphadenopathy present.  Cardiovascular: Regular rate, regular rhythm, S1 normal and S2 normal.  Exam reveals no gallop and no friction rub.  No murmur heard.  Pulmonary/Chest: Lungs are clear to auscultation bilaterally. No respiratory distress. No wheezes. No rhonchi. No rales.   Abdominal: Soft. Bowel sounds are normal. No distension and no mass. There is no hepatosplenomegaly. There is no tenderness.   Musculoskeletal: Normal Muscle tone  Extremities: No edema. Pulses are palpable in all 4 extremities.  Neurological: Patient is alert and oriented to person, place, and time. Cranial nerves II-XII are grossly intact with no focal deficits.  Skin: Skin is warm. No rash noted. Nails show no clubbing.  No cyanosis or erythema.    Debilities/Disabilities Identified: None  Emotional Behavior: Appropriate     Results Review:   I reviewed the patient's new clinical results.    Lab Results (most recent)       None            Imaging Results (Most Recent)       None          reviewed    ECG/EMG Results (most recent)       None          reviewed    Assessment & Plan   Personal hx colon polyps and dysphagia/  EGD and colonoscopy      I discussed the patient's findings and my  recommendations with patient.     Odilon Wayne MD  07/08/24  10:32 EDT    Time: 10 min prior to procedure.

## 2024-07-09 ENCOUNTER — TELEPHONE (OUTPATIENT)
Dept: GASTROENTEROLOGY | Facility: CLINIC | Age: 59
End: 2024-07-09
Payer: COMMERCIAL

## 2025-01-20 ENCOUNTER — TELEPHONE (OUTPATIENT)
Dept: GASTROENTEROLOGY | Facility: CLINIC | Age: 60
End: 2025-01-20
Payer: COMMERCIAL

## 2025-01-20 NOTE — TELEPHONE ENCOUNTER
PT past due for follow up   See my chart    Sangeetha for Ibsrela - Cancel per last note transition to Trulance

## 2025-03-03 ENCOUNTER — OFFICE VISIT (OUTPATIENT)
Dept: GASTROENTEROLOGY | Facility: CLINIC | Age: 60
End: 2025-03-03
Payer: COMMERCIAL

## 2025-03-03 VITALS
HEIGHT: 59 IN | SYSTOLIC BLOOD PRESSURE: 106 MMHG | DIASTOLIC BLOOD PRESSURE: 64 MMHG | WEIGHT: 127.4 LBS | BODY MASS INDEX: 25.68 KG/M2

## 2025-03-03 DIAGNOSIS — R13.19 ESOPHAGEAL DYSPHAGIA: ICD-10-CM

## 2025-03-03 DIAGNOSIS — K58.1 IRRITABLE BOWEL SYNDROME WITH CONSTIPATION: Primary | ICD-10-CM

## 2025-03-03 DIAGNOSIS — K21.00 GASTROESOPHAGEAL REFLUX DISEASE WITH ESOPHAGITIS WITHOUT HEMORRHAGE: ICD-10-CM

## 2025-03-03 PROCEDURE — 1159F MED LIST DOCD IN RCRD: CPT

## 2025-03-03 PROCEDURE — 99214 OFFICE O/P EST MOD 30 MIN: CPT

## 2025-03-03 PROCEDURE — 1160F RVW MEDS BY RX/DR IN RCRD: CPT

## 2025-03-03 RX ORDER — AMOXICILLIN 250 MG
1 CAPSULE ORAL NIGHTLY
Qty: 180 TABLET | Refills: 3 | Status: SHIPPED | OUTPATIENT
Start: 2025-03-03

## 2025-03-03 NOTE — PATIENT INSTRUCTIONS
Scheduling of your Ordered Diagnostic Tests- esophagram to further assess your trouble swallowing :    A team member from [facility] scheduling department will contact you to schedule your tests.    You can also contact them directly at [phone]    Ways to help reduce heartburn symptoms:    continue Prilosec/Omeprazole 40  mg 2  times per day prior to breakfast and dinner  Avoid eating late night snacks within 3 hours of going to bed  If you have increased abdominal body weight, lifestyle changes to improve weight can help with heartburn symtoms  If you use tobacco products, we recommend that you stop smoking including e-cigarettes  Research has proven that people with heartburn who use tobacco can reduce heartburn symptoms by 44% after they stop smoking.   Sleep on your left side  Sleeping with your head/upper body elevated with a wedge pillow or with the head of the bed inclined   Avoid foods that can trigger symptoms which may include:  citrus fruits  spicy foods,  Tomatoes and Red sauces   Chocolate  coffee/tea  caffeinated or carbonated beverages  Alcohol  High fat foods  peppermint    Trulance:      How it works:   It works by increasing secretion of fluid into the intestines which helps in the passing of stool. It also calms pain-sensing nerves in the intestinal tract.  Which symptoms does it help:   Overall IBS-C symptoms, constipation, belly pain, bloating.  How long it takes to see a benefit:   Benefits for bowel habits in 1-2 weeks. Benefits for belly pain and bloating may take longer.  FDA approved: IBS-C  Dosage:   3 mg taken once daily with or without food   Most common side effects: Diarrhea    Prescription sent in for senna take 1-2 tablets every night to also help improve frequency and completeness of bowel movements

## 2025-03-03 NOTE — PROGRESS NOTES
Patient or patient representative verbalized consent for the use of Ambient Listening during the visit with  VI Fine for chart documentation. 3/3/2025  13:20 EST    Chief Complaint   Patient presents with    Irritable Bowel Syndrome    Choking    Difficulty Swallowing         Patient is a 60 y.o. who presents to the office for follow-up evaluation of dyspepsia and chronic cough.  Last in office visit completed on 4/11/2024 with Dr. Wayne. patient has a significant past medical history of IBS-C, COPD, asthma, DVT on Xarelto, and colon polyps.    7/8/2024 EGD and Colonoscopy:  EGD:  2 cm hiatal hernia  Path:  Schatzki's ring successfully dilated  Nonerosive gastritis  Endoscopically normal-appearing duodenum  Recommended increasing omeprazole 40 mg from once to twice daily  Colonoscopy:  Bowel prep described as good  Endoscopically normal-appearing colon  Next Colonoscopy for screening recommended at 10 -year interval due 7/8/2034    Past Surgical History   Hysterectomy  Cholecystectomy    Social History  Current everyday tobacco use  Denies alcohol or illicit drug use    Family History  Denies known family history of colon cancer, colon polyps, or IBD    History of Present Illness  The patient presents with dysphagia, gastroesophageal reflux disease (GERD), constipation, and eructation.    Dysphagia  - The patient reports a persistent sensation of choking, with a significant episode occurring one week ago that necessitated emergency intervention due to cyanosis, followed by emesis.  - A similar episode was noted the following morning during breakfast.  - The patient has not undergone esophageal dilation.  - They experience nocturnal dyspnea, which disrupts their sleep.  - A polysomnography was postponed due to a family bereavement.    Gastroesophageal Reflux Disease (GERD)  - The patient has severe GERD, managed with omeprazole 20 mg twice daily.  - They report no breakthrough symptoms except for one  severe episode of burning sensation associated with choking and subsequent vomiting.  - They have not attempted taking omeprazole prior to dinner.    Constipation  - Bowel movements have improved with the use of Trulance, now occurring twice weekly, although the patient does not feel complete evacuation.  - There is no hematochezia.    Eructation  - The patient occasionally experiences severe eructation, described as similar to vomiting.  - They do not frequently consume mints or use straws.  - Their last meal is typically consumed before 6:00 PM.             Current/Previous treatment for GERD  Current:  Omeprazole 40 mg twice daily    Current/Previous treatment for constipation  Current:  Trulance 3 mg  Previous:  Linzess 145 mcg-not efficacious  Ibsrela 50 mg daily-intolerable diarrhea      Medications    Current Outpatient Medications:     Aclidinium Bromide (TUDORZA PRESSAIR IN), Inhale., Disp: , Rfl:     albuterol (PROVENTIL HFA;VENTOLIN HFA) 108 (90 BASE) MCG/ACT inhaler, Inhale 2 puffs Every 4 (Four) Hours As Needed for Wheezing., Disp: , Rfl:     ARIPiprazole (ABILIFY) 5 MG tablet, Take  by mouth Daily., Disp: , Rfl:     Banophen 25 MG tablet, Take 1 tablet by mouth At Night As Needed., Disp: , Rfl:     CarBAMazepine ER, Antipsych, 300 MG capsule sustained-release 12 hr, Take  by mouth 2 (two) times a day., Disp: , Rfl:     citalopram (CeleXA) 20 MG tablet, Take 1 tablet by mouth Daily., Disp: , Rfl:     DULoxetine (CYMBALTA) 30 MG capsule, Take 1 capsule by mouth Daily., Disp: , Rfl:     fluticasone-salmeterol (ADVAIR) 250-50 MCG/DOSE DISKUS, Inhale 2 (two) times a day., Disp: , Rfl:     loratadine (CLARITIN) 10 MG tablet, Take 1 tablet by mouth Daily., Disp: , Rfl:     omeprazole (PrilOSEC) 40 MG capsule, Take 1 capsule by mouth 2 (Two) Times a Day Before Meals., Disp: 180 capsule, Rfl: 3    Plecanatide (Trulance) 3 MG tablet, Take 1 tablet by mouth Daily., Disp: 30 tablet, Rfl: 11    pramipexole  "(MIRAPEX) 0.25 MG tablet, Take 1 tablet by mouth., Disp: , Rfl:     venlafaxine (EFFEXOR) 75 MG tablet, Take 1 tablet by mouth 2 (Two) Times a Day., Disp: , Rfl:     lisinopril (PRINIVIL,ZESTRIL) 2.5 MG tablet, Take 1 tablet by mouth Daily., Disp: , Rfl:     sennosides-docusate (senna-docusate sodium) 8.6-50 MG per tablet, Take 1 tablet by mouth Every Night., Disp: 180 tablet, Rfl: 3    Sodium Phosphates (Fleet Enema) enema, Insert 1 application  into the rectum Daily As Needed (constipation) for up to 14 doses. (Patient not taking: Reported on 3/3/2025), Disp: 133 mL, Rfl: 0  Result Review :        Upper GI Endoscopy (07/08/2024 10:17)  Colonoscopy (07/08/2024 10:17)  Office Visit with Odilon Wayne MD (04/11/2024)   1/2024-gastric pathology negative for H. pylori or intestinal metaplasia  Vital Signs:   /64 (BP Location: Left arm, Patient Position: Sitting, Cuff Size: Adult)   Ht 149.9 cm (59.02\")   Wt 57.8 kg (127 lb 6.4 oz)   BMI 25.72 kg/m²     Body mass index is 25.72 kg/m².      Physical Exam  Constitutional:       General: She is not in acute distress.     Appearance: Normal appearance. She is not ill-appearing.   HENT:      Head: Normocephalic.   Eyes:      General: No scleral icterus.  Pulmonary:      Effort: No respiratory distress.   Abdominal:      General: Abdomen is flat. Bowel sounds are normal. There is no distension.      Palpations: Abdomen is soft. There is no mass.      Tenderness: There is no abdominal tenderness. There is no guarding or rebound.      Hernia: No hernia is present.   Skin:     General: Skin is warm and dry.   Neurological:      Mental Status: She is alert.      Gait: Gait normal.   Psychiatric:         Mood and Affect: Mood normal.       Assessment and Plan    Diagnoses and all orders for this visit:    1. Irritable bowel syndrome with constipation (Primary)  -     sennosides-docusate (senna-docusate sodium) 8.6-50 MG per tablet; Take 1 tablet by mouth Every Night.  " Dispense: 180 tablet; Refill: 3    2. Gastroesophageal reflux disease with esophagitis without hemorrhage    3. Esophageal dysphagia  -     FL ESOPHAGRAM DOUBLE CONTRAST; Future       Assessment & Plan  Dysphagia  - Reports choking on solid foods, leading to vomiting and discomfort  - Has Schatzki's ring  - Esophagram to further assess dysphagia and possible abnormal esophageal motility changes contributing to recurrence of dysphagia symptoms  - Continue omeprazole twice daily  - Reschedule sleep apnea study to ensure this is not contributing to nocturnal awakening  -If evidence of stricturing or narrowing in the esophagus will plan to update EGD and dilate at time of procedure if indicated    Acid Reflux  - Severe acid reflux managed with omeprazole twice daily  - No breakthrough symptoms except during choking and vomiting episodes  - Continue omeprazole 40 mg twice daily and adjust evening dosing to prior to dinner  - Esophagram to determine underlying issues    Constipation  - Bowel movements twice a week with Trulance, but incomplete relief  - Continue Trulance in the morning  - Add Senokot 1-2 tablets nightly to improve frequency and completeness  - Prescription for Senokot sent to pharmacy  - Inform provider if ineffective for dosage adjustment    Belching  - Frequent belching, possibly exacerbated by mints and hiatal hernia  - Avoid mints and monitor symptoms  - Esophagram to determine underlying issues    Follow-up in 12 weeks or sooner for any new or worsening GI concerns    Patient is agreeable to the outlined above treatment plan.  Verbalizes understanding and will contact office for any new or worsening concerns.  All questions answered and support provided.  Patient Instructions   Scheduling of your Ordered Diagnostic Tests- esophagram to further assess your trouble swallowing :    A team member from [facility] scheduling department will contact you to schedule your tests.    You can also contact  them directly at [phone]    Ways to help reduce heartburn symptoms:    continue Prilosec/Omeprazole 40  mg 2  times per day prior to breakfast and dinner  Avoid eating late night snacks within 3 hours of going to bed  If you have increased abdominal body weight, lifestyle changes to improve weight can help with heartburn symtoms  If you use tobacco products, we recommend that you stop smoking including e-cigarettes  Research has proven that people with heartburn who use tobacco can reduce heartburn symptoms by 44% after they stop smoking.   Sleep on your left side  Sleeping with your head/upper body elevated with a wedge pillow or with the head of the bed inclined   Avoid foods that can trigger symptoms which may include:  citrus fruits  spicy foods,  Tomatoes and Red sauces   Chocolate  coffee/tea  caffeinated or carbonated beverages  Alcohol  High fat foods  peppermint    Trulance:      How it works:   It works by increasing secretion of fluid into the intestines which helps in the passing of stool. It also calms pain-sensing nerves in the intestinal tract.  Which symptoms does it help:   Overall IBS-C symptoms, constipation, belly pain, bloating.  How long it takes to see a benefit:   Benefits for bowel habits in 1-2 weeks. Benefits for belly pain and bloating may take longer.  FDA approved: IBS-C  Dosage:   3 mg taken once daily with or without food   Most common side effects: Diarrhea    Prescription sent in for senna take 1-2 tablets every night to also help improve frequency and completeness of bowel movements       EMR Dragon/Transcription Disclaimer:  This document has been Dictated utilizing Dragon dictation.     Lupe Spears, MSN, APRN, FNP-C   [facility]  Gastroenterology   [address] [facility]. [address] [place], [place] [id number] [phone] office  [phone] fax

## 2025-03-24 DIAGNOSIS — K58.1 IRRITABLE BOWEL SYNDROME WITH CONSTIPATION: ICD-10-CM

## 2025-03-25 RX ORDER — PLECANATIDE 3 MG/1
1 TABLET ORAL DAILY
Qty: 30 TABLET | Refills: 0 | Status: SHIPPED | OUTPATIENT
Start: 2025-03-25

## 2025-04-08 ENCOUNTER — TRANSCRIBE ORDERS (OUTPATIENT)
Dept: CT IMAGING | Facility: HOSPITAL | Age: 60
End: 2025-04-08
Payer: COMMERCIAL

## 2025-04-09 DIAGNOSIS — R13.19 ESOPHAGEAL DYSPHAGIA: ICD-10-CM

## 2025-04-09 DIAGNOSIS — K21.00 GASTROESOPHAGEAL REFLUX DISEASE WITH ESOPHAGITIS WITHOUT HEMORRHAGE: Primary | ICD-10-CM

## 2025-04-11 ENCOUNTER — HOSPITAL ENCOUNTER (OUTPATIENT)
Dept: GENERAL RADIOLOGY | Facility: HOSPITAL | Age: 60
Discharge: HOME | End: 2025-04-11
Payer: COMMERCIAL

## 2025-04-11 DIAGNOSIS — K21.00 GASTROESOPHAGEAL REFLUX DISEASE WITH ESOPHAGITIS WITHOUT HEMORRHAGE: ICD-10-CM

## 2025-04-11 DIAGNOSIS — R13.19 ESOPHAGEAL DYSPHAGIA: ICD-10-CM

## 2025-04-11 PROCEDURE — 74221 X-RAY XM ESOPHAGUS 2CNTRST: CPT

## 2025-04-11 RX ADMIN — BARIUM SULFATE 183 ML: 960 POWDER, FOR SUSPENSION ORAL at 12:19

## 2025-04-11 RX ADMIN — ANTACID/ANTIFLATULENT 1 PACKET: 380; 550; 10; 10 GRANULE, EFFERVESCENT ORAL at 12:19

## 2025-04-22 DIAGNOSIS — K58.1 IRRITABLE BOWEL SYNDROME WITH CONSTIPATION: ICD-10-CM

## 2025-04-23 RX ORDER — PLECANATIDE 3 MG/1
1 TABLET ORAL DAILY
Qty: 30 TABLET | Refills: 11 | Status: SHIPPED | OUTPATIENT
Start: 2025-04-23

## (undated) DEVICE — Device: Brand: DEFENDO AIR/WATER/SUCTION AND BIOPSY VALVE

## (undated) DEVICE — SOL IRR H2O BTL 1000ML STRL

## (undated) DEVICE — JACKT LAB F/R KNIT CUFF/COLR XLG BLU

## (undated) DEVICE — VIAL FORMALIN CAP 10P 40ML

## (undated) DEVICE — THE BITE BLOCK MAXI, LATEX FREE STRAP IS USED TO PROTECT THE ENDOSCOPE INSERTION TUBE FROM BEING BITTEN BY THE PATIENT.

## (undated) DEVICE — Device

## (undated) DEVICE — GOWN ISOL W/THUMB UNIV BLU BX/15

## (undated) DEVICE — ADAPT CLN BIOGUARD AIR/H2O DISP

## (undated) DEVICE — SUCTION CANISTER, 3000CC,SAFELINER: Brand: DEROYAL

## (undated) DEVICE — LINER SURG CANSTR SXN S/RIGD 1500CC

## (undated) DEVICE — BW-412T DISP COMBO CLEANING BRUSH: Brand: SINGLE USE COMBINATION CLEANING BRUSH

## (undated) DEVICE — FRCP BX RADJAW4 NDL 2.8 240CM LG OG BX40

## (undated) DEVICE — KT ORCA ORCAPOD DISP STRL

## (undated) DEVICE — ENDOGATOR AUXILIARY WATER JET CONNECTOR: Brand: ENDOGATOR

## (undated) DEVICE — SYR LL W/SCALE/MARK 3ML STRL

## (undated) DEVICE — GLV SURG NEOLON 2G PF LF 7.5 STRL

## (undated) DEVICE — SYR LL 3CC

## (undated) DEVICE — SYR LUER SLPTP 50ML

## (undated) DEVICE — SPNG GZ WOVN 4X4IN 12PLY 10/BX STRL

## (undated) DEVICE — MEDI-VAC NON-CONDUCTIVE SUCTION TUBING: Brand: CARDINAL HEALTH

## (undated) DEVICE — MASK,FACE,SHIELD,BLUE,ANTI FOG,TIES: Brand: MEDLINE